# Patient Record
Sex: FEMALE | Race: WHITE | NOT HISPANIC OR LATINO | Employment: UNEMPLOYED | URBAN - METROPOLITAN AREA
[De-identification: names, ages, dates, MRNs, and addresses within clinical notes are randomized per-mention and may not be internally consistent; named-entity substitution may affect disease eponyms.]

---
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
## 2024-01-08 ENCOUNTER — ROUTINE PRENATAL (OUTPATIENT)
Dept: OBSTETRICS AND GYNECOLOGY | Facility: CLINIC | Age: 19
End: 2024-01-08
Payer: COMMERCIAL

## 2024-01-08 VITALS — WEIGHT: 156 LBS | BODY MASS INDEX: 25.96 KG/M2 | SYSTOLIC BLOOD PRESSURE: 107 MMHG | DIASTOLIC BLOOD PRESSURE: 66 MMHG

## 2024-01-08 DIAGNOSIS — K21.9 GASTROESOPHAGEAL REFLUX DURING PREGNANCY IN THIRD TRIMESTER, ANTEPARTUM: ICD-10-CM

## 2024-01-08 DIAGNOSIS — Z3A.29 29 WEEKS GESTATION OF PREGNANCY: Primary | ICD-10-CM

## 2024-01-08 DIAGNOSIS — O99.613 GASTROESOPHAGEAL REFLUX DURING PREGNANCY IN THIRD TRIMESTER, ANTEPARTUM: ICD-10-CM

## 2024-01-08 DIAGNOSIS — Z34.03 PRIMIGRAVIDA IN THIRD TRIMESTER: ICD-10-CM

## 2024-01-08 LAB
GLUCOSE UR STRIP-MCNC: NEGATIVE MG/DL
PROT UR STRIP-MCNC: NEGATIVE MG/DL

## 2024-01-08 PROCEDURE — 0502F SUBSEQUENT PRENATAL CARE: CPT | Performed by: OBSTETRICS & GYNECOLOGY

## 2024-01-08 RX ORDER — OMEPRAZOLE 20 MG/1
20 CAPSULE, DELAYED RELEASE ORAL DAILY
Qty: 30 CAPSULE | Refills: 1 | Status: SHIPPED | OUTPATIENT
Start: 2024-01-08 | End: 2025-01-07

## 2024-01-08 RX ORDER — ACETAMINOPHEN 325 MG/1
TABLET ORAL
COMMUNITY
Start: 2023-12-10

## 2024-01-08 NOTE — PROGRESS NOTES
Cc:  Pregnancy follow up.  Good FM.  Reports issues with reflux that is unresponsive to Famotidine.  No bleeding or spotting.  Vitals reviewed by me.  Gen - alert and pleasant.  Abdomen - nontender  Ultrasound with EFW at 70 percentile.  A/P:  IUP at 29 weeks  - GERD.  Discussed diet and GERD.  Discussed avoiding certain foods.  Will try Prilosec.  - Discussed maternal well being, vaccinations and their importance.

## 2024-01-25 RX ORDER — FAMOTIDINE 20 MG/1
TABLET, FILM COATED ORAL
Qty: 60 TABLET | OUTPATIENT
Start: 2024-01-25

## 2024-01-25 NOTE — TELEPHONE ENCOUNTER
I spoke to the patient she is aware the Pepcid was denied for a refill and because her prescription was changed to Prilosec. She was advised to not take Prilosec and Pepcid together. Patient voiced understanding. 1-25-24/lw

## 2024-01-29 ENCOUNTER — ROUTINE PRENATAL (OUTPATIENT)
Dept: OBSTETRICS AND GYNECOLOGY | Facility: CLINIC | Age: 19
End: 2024-01-29
Payer: COMMERCIAL

## 2024-01-29 VITALS — SYSTOLIC BLOOD PRESSURE: 97 MMHG | BODY MASS INDEX: 26.79 KG/M2 | DIASTOLIC BLOOD PRESSURE: 64 MMHG | WEIGHT: 161 LBS

## 2024-01-29 DIAGNOSIS — Z3A.32 32 WEEKS GESTATION OF PREGNANCY: ICD-10-CM

## 2024-01-29 DIAGNOSIS — O99.891 BACK PAIN AFFECTING PREGNANCY IN THIRD TRIMESTER: ICD-10-CM

## 2024-01-29 DIAGNOSIS — Z23 NEED FOR TDAP VACCINATION: ICD-10-CM

## 2024-01-29 DIAGNOSIS — Z34.03 PRIMIGRAVIDA IN THIRD TRIMESTER: Primary | ICD-10-CM

## 2024-01-29 DIAGNOSIS — K21.9 GASTROESOPHAGEAL REFLUX DURING PREGNANCY IN THIRD TRIMESTER, ANTEPARTUM: ICD-10-CM

## 2024-01-29 DIAGNOSIS — O26.843 FUNDAL HEIGHT LOW FOR DATES IN THIRD TRIMESTER: ICD-10-CM

## 2024-01-29 DIAGNOSIS — O99.613 GASTROESOPHAGEAL REFLUX DURING PREGNANCY IN THIRD TRIMESTER, ANTEPARTUM: ICD-10-CM

## 2024-01-29 DIAGNOSIS — M54.9 BACK PAIN AFFECTING PREGNANCY IN THIRD TRIMESTER: ICD-10-CM

## 2024-01-29 PROCEDURE — 0502F SUBSEQUENT PRENATAL CARE: CPT | Performed by: OBSTETRICS & GYNECOLOGY

## 2024-01-29 PROCEDURE — 90471 IMMUNIZATION ADMIN: CPT | Performed by: OBSTETRICS & GYNECOLOGY

## 2024-01-29 PROCEDURE — 90715 TDAP VACCINE 7 YRS/> IM: CPT | Performed by: OBSTETRICS & GYNECOLOGY

## 2024-01-29 NOTE — PROGRESS NOTES
Cc:  Pregnancy follow up.  No complaints except for back pain and swelling.  She is having some difficulties completing work tasks and requests note for accommodations including sitting at work and wearing sensible shoes.. GERD is better with taking Prilosec. Good FM.    Vitals reviewed by me.  Gen - alert and pleasant.  Abdomen - nontender, no guarding or rebound.  A/P:  IUP at 32 weeks with GERD, fundal height low, back pain.  - Patient received Tdap today, in office, no reaction. RSV vaccine information give.  - GERD.  Continue Prilosec.  - S<D.  Sonogram next visit.  - Back pain.  Discussed maternity belt.  - Discussed maternal well being.

## 2024-02-12 ENCOUNTER — TELEPHONE (OUTPATIENT)
Dept: OBSTETRICS AND GYNECOLOGY | Facility: CLINIC | Age: 19
End: 2024-02-12

## 2024-02-12 ENCOUNTER — ROUTINE PRENATAL (OUTPATIENT)
Dept: OBSTETRICS AND GYNECOLOGY | Facility: CLINIC | Age: 19
End: 2024-02-12
Payer: COMMERCIAL

## 2024-02-12 VITALS — SYSTOLIC BLOOD PRESSURE: 114 MMHG | WEIGHT: 164 LBS | DIASTOLIC BLOOD PRESSURE: 75 MMHG | BODY MASS INDEX: 27.29 KG/M2

## 2024-02-12 DIAGNOSIS — A60.00 GENITAL HERPES SIMPLEX, UNSPECIFIED SITE: ICD-10-CM

## 2024-02-12 DIAGNOSIS — Z34.03 PRIMIGRAVIDA IN THIRD TRIMESTER: Primary | ICD-10-CM

## 2024-02-12 DIAGNOSIS — Z3A.34 34 WEEKS GESTATION OF PREGNANCY: ICD-10-CM

## 2024-02-12 PROCEDURE — 0502F SUBSEQUENT PRENATAL CARE: CPT | Performed by: OBSTETRICS & GYNECOLOGY

## 2024-02-12 RX ORDER — VALACYCLOVIR HYDROCHLORIDE 500 MG/1
500 TABLET, FILM COATED ORAL DAILY
Qty: 30 TABLET | Refills: 1 | Status: SHIPPED | OUTPATIENT
Start: 2024-02-12 | End: 2024-04-12

## 2024-02-26 ENCOUNTER — ROUTINE PRENATAL (OUTPATIENT)
Dept: OBSTETRICS AND GYNECOLOGY | Facility: CLINIC | Age: 19
End: 2024-02-26
Payer: COMMERCIAL

## 2024-02-26 VITALS — BODY MASS INDEX: 27.96 KG/M2 | SYSTOLIC BLOOD PRESSURE: 109 MMHG | DIASTOLIC BLOOD PRESSURE: 73 MMHG | WEIGHT: 168 LBS

## 2024-02-26 DIAGNOSIS — Z36.85 ANTENATAL SCREENING FOR STREPTOCOCCUS B: ICD-10-CM

## 2024-02-26 DIAGNOSIS — Z11.3 SCREENING FOR STD (SEXUALLY TRANSMITTED DISEASE): ICD-10-CM

## 2024-02-26 DIAGNOSIS — Z3A.36 36 WEEKS GESTATION OF PREGNANCY: Primary | ICD-10-CM

## 2024-02-26 DIAGNOSIS — B00.9 HERPES SIMPLEX VIRUS TYPE 2 (HSV-2) INFECTION AFFECTING PREGNANCY IN THIRD TRIMESTER: ICD-10-CM

## 2024-02-26 DIAGNOSIS — O98.513 HERPES SIMPLEX VIRUS TYPE 2 (HSV-2) INFECTION AFFECTING PREGNANCY IN THIRD TRIMESTER: ICD-10-CM

## 2024-02-26 DIAGNOSIS — N89.8 VAGINAL ODOR: ICD-10-CM

## 2024-02-26 LAB
GLUCOSE UR STRIP-MCNC: NEGATIVE MG/DL
PROT UR STRIP-MCNC: NEGATIVE MG/DL

## 2024-02-26 PROCEDURE — 0502F SUBSEQUENT PRENATAL CARE: CPT | Performed by: NURSE PRACTITIONER

## 2024-02-26 NOTE — PROGRESS NOTES
Chief Complaint   Patient presents with    Routine Prenatal Visit       OB follow up     Travon Weller is a 18 y.o.  36w2d being seen today for her obstetrical visit.  Patient reports ongoing fish like vaginal odor, mild discharge present, this started around 2 weeks ago. C/o pelvic pain after she has been resting. Denies cramping or contractions. Fetal movement: normal. She requests syphilis testing today. Denies current symptoms, states she has heard about the rise of positive cases and wants to be safe.     Review of Systems  Cramping/contractions: denies  Vaginal bleeding: denies  Fetal movement: normal    /73   Wt 76.2 kg (168 lb)   LMP 2023   BMI 27.96 kg/m²     Assessment/Plan    Diagnoses and all orders for this visit:    1. 36 weeks gestation of pregnancy (Primary)  -     POC Urinalysis Dipstick    2.  screening for streptococcus B  -     Strep Gp B Culture+Rfx ([facility]) - Swab, Vaginal/Rectum    3. Herpes simplex virus type 2 (HSV-2) infection affecting pregnancy in third trimester    4. Vaginal odor  -     NuSwab VG+ - Swab, Vagina    5. Screening for STD (sexually transmitted disease)  -     RPR, Rfx Qn RPR / Confirm TP       Reviewed fetal kick counts  Reviewed S&S labor  GBS collected today  SVE FT/50/-3  Vaginal cultures obtained for c/o discharge and odor, scant white discharge noted, will await cultures and treat if indicated  RPR per pt request, discussed will get an additional test upon admission to hospital.   Continue valtrex  Reviewed this stage of pregnancy  Problem list updated     Follow up in 1 week(s) with me    I spent 30 minutes caring for Travon on this date of service. This time includes time spent by me in the following activities: preparing for the visit, reviewing tests, obtaining and/or reviewing a separately obtained history, performing a medically appropriate examination and/or evaluation, counseling and educating the patient/family/caregiver,  ordering medications, tests, or procedures, referring and communicating with other health care professionals, documenting information in the medical record, and independently interpreting results and communicating that information with the patient/family/caregiver    Ruby Romo, APRN  2/26/2024  14:45 EST

## 2024-02-27 LAB — RPR SER QL: NON REACTIVE

## 2024-02-28 LAB
A VAGINAE DNA VAG QL NAA+PROBE: ABNORMAL SCORE
BVAB2 DNA VAG QL NAA+PROBE: ABNORMAL SCORE
C ALBICANS DNA VAG QL NAA+PROBE: POSITIVE
C GLABRATA DNA VAG QL NAA+PROBE: NEGATIVE
C TRACH DNA VAG QL NAA+PROBE: POSITIVE
MEGA1 DNA VAG QL NAA+PROBE: ABNORMAL SCORE
N GONORRHOEA DNA VAG QL NAA+PROBE: NEGATIVE
T VAGINALIS DNA VAG QL NAA+PROBE: NEGATIVE

## 2024-02-29 ENCOUNTER — TELEPHONE (OUTPATIENT)
Dept: OBSTETRICS AND GYNECOLOGY | Facility: CLINIC | Age: 19
End: 2024-02-29
Payer: COMMERCIAL

## 2024-02-29 RX ORDER — FLUCONAZOLE 150 MG/1
150 TABLET ORAL ONCE
Qty: 1 TABLET | Refills: 0 | Status: SHIPPED | OUTPATIENT
Start: 2024-02-29 | End: 2024-02-29

## 2024-02-29 RX ORDER — AZITHROMYCIN 500 MG/1
1000 TABLET, FILM COATED ORAL DAILY
Qty: 2 TABLET | Refills: 0 | Status: SHIPPED | OUTPATIENT
Start: 2024-02-29 | End: 2024-03-01

## 2024-02-29 NOTE — TELEPHONE ENCOUNTER
[facility]. No answer, left VM to return my call    Vaginal cultures returned positive for chlamydia and yeast.  I have sent azithromycin and diflucan to pharmacy to treat. I recommend that her partner be tested and treated, she should avoid intercourse until seven days after both have been treated. I recommend a follow up appointment in 6-8 weeks for repeat testing to make sure STI is resolved. I recommend the use of condoms to prevent sexually transmitted infections    Ruby Romo, APRN  2/29/2024  08:46 EST

## 2024-02-29 NOTE — PROGRESS NOTES
Positive chlamydia result in third trimester. I reached out to the pt today with no answer. Left  to return my call. She has reviewed results on [facility]. I have sent antibiotics to her pharmacy -Ruby

## 2024-03-01 LAB — B-HEM STREP SPEC QL CULT: NEGATIVE

## 2024-03-04 ENCOUNTER — ROUTINE PRENATAL (OUTPATIENT)
Dept: OBSTETRICS AND GYNECOLOGY | Facility: CLINIC | Age: 19
End: 2024-03-04
Payer: COMMERCIAL

## 2024-03-04 DIAGNOSIS — B00.9 HERPES SIMPLEX VIRUS TYPE 2 (HSV-2) INFECTION AFFECTING PREGNANCY IN THIRD TRIMESTER: ICD-10-CM

## 2024-03-04 DIAGNOSIS — Z3A.37 37 WEEKS GESTATION OF PREGNANCY: ICD-10-CM

## 2024-03-04 DIAGNOSIS — O98.513 HERPES SIMPLEX VIRUS TYPE 2 (HSV-2) INFECTION AFFECTING PREGNANCY IN THIRD TRIMESTER: ICD-10-CM

## 2024-03-04 DIAGNOSIS — Z34.03 PRIMIGRAVIDA IN THIRD TRIMESTER: Primary | ICD-10-CM

## 2024-03-04 PROCEDURE — 0502F SUBSEQUENT PRENATAL CARE: CPT | Performed by: OBSTETRICS & GYNECOLOGY

## 2024-03-04 NOTE — PROGRESS NOTES
Cc:  Pregnancy follow up.  Patient c/o ctxs. She did complete her treatment for Chlamydia, partner his going for treatment today.    Vitals reviewed by me.  Gen - alert and pleasant.  Abdomen - nontender  Cervix - 80/1-2/-2  GBS is negative.  A/P:  IUP at 37 weeks with HSV history.  - HSV.  Continue Valtrex for prophylaxis.  - Labor precautions.

## 2024-03-06 ENCOUNTER — HOSPITAL ENCOUNTER (EMERGENCY)
Facility: HOSPITAL | Age: 19
Discharge: HOME | End: 2024-03-06
Attending: OBSTETRICS & GYNECOLOGY | Admitting: OBSTETRICS & GYNECOLOGY
Payer: COMMERCIAL

## 2024-03-06 VITALS
DIASTOLIC BLOOD PRESSURE: 59 MMHG | HEIGHT: 65 IN | BODY MASS INDEX: 28.32 KG/M2 | OXYGEN SATURATION: 98 % | WEIGHT: 170 LBS | SYSTOLIC BLOOD PRESSURE: 92 MMHG | TEMPERATURE: 98.1 F | RESPIRATION RATE: 16 BRPM | HEART RATE: 108 BPM

## 2024-03-06 LAB
A1 MICROGLOB PLACENTAL VAG QL: NEGATIVE
BACTERIA UR QL AUTO: ABNORMAL /HPF
BILIRUB UR QL STRIP: NEGATIVE
CLARITY UR: CLEAR
COLOR UR: ABNORMAL
GLUCOSE UR STRIP-MCNC: NEGATIVE MG/DL
HGB UR QL STRIP.AUTO: NEGATIVE
HYALINE CASTS UR QL AUTO: ABNORMAL /LPF
KETONES UR QL STRIP: ABNORMAL
LEUKOCYTE ESTERASE UR QL STRIP.AUTO: ABNORMAL
NITRITE UR QL STRIP: NEGATIVE
PH UR STRIP.AUTO: 6.5 [PH] (ref 5–8)
PROT UR QL STRIP: ABNORMAL
RBC # UR STRIP: ABNORMAL /HPF
REF LAB TEST METHOD: ABNORMAL
SP GR UR STRIP: >=1.03 (ref 1–1.03)
SQUAMOUS #/AREA URNS HPF: ABNORMAL /HPF
UROBILINOGEN UR QL STRIP: ABNORMAL
WBC # UR STRIP: ABNORMAL /HPF

## 2024-03-06 PROCEDURE — 84112 EVAL AMNIOTIC FLUID PROTEIN: CPT | Performed by: OBSTETRICS & GYNECOLOGY

## 2024-03-06 PROCEDURE — 99284 EMERGENCY DEPT VISIT MOD MDM: CPT | Performed by: OBSTETRICS & GYNECOLOGY

## 2024-03-06 PROCEDURE — 59025 FETAL NON-STRESS TEST: CPT

## 2024-03-06 PROCEDURE — 81001 URINALYSIS AUTO W/SCOPE: CPT | Performed by: OBSTETRICS & GYNECOLOGY
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
## 2024-03-09 ENCOUNTER — HOSPITAL ENCOUNTER (EMERGENCY)
Facility: HOSPITAL | Age: 19
Discharge: HOME | End: 2024-03-10
Attending: OBSTETRICS & GYNECOLOGY | Admitting: STUDENT IN AN ORGANIZED HEALTH CARE EDUCATION/TRAINING PROGRAM
Payer: COMMERCIAL

## 2024-03-09 PROCEDURE — 99284 EMERGENCY DEPT VISIT MOD MDM: CPT | Performed by: STUDENT IN AN ORGANIZED HEALTH CARE EDUCATION/TRAINING PROGRAM

## 2024-03-09 PROCEDURE — 84112 EVAL AMNIOTIC FLUID PROTEIN: CPT | Performed by: STUDENT IN AN ORGANIZED HEALTH CARE EDUCATION/TRAINING PROGRAM

## 2024-03-10 VITALS
HEART RATE: 88 BPM | RESPIRATION RATE: 16 BRPM | BODY MASS INDEX: 28.22 KG/M2 | WEIGHT: 169.6 LBS | OXYGEN SATURATION: 98 % | DIASTOLIC BLOOD PRESSURE: 73 MMHG | SYSTOLIC BLOOD PRESSURE: 102 MMHG

## 2024-03-10 LAB — A1 MICROGLOB PLACENTAL VAG QL: NEGATIVE

## 2024-03-10 PROCEDURE — 59025 FETAL NON-STRESS TEST: CPT
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
## 2024-03-11 ENCOUNTER — ROUTINE PRENATAL (OUTPATIENT)
Dept: OBSTETRICS AND GYNECOLOGY | Facility: CLINIC | Age: 19
End: 2024-03-11
Payer: COMMERCIAL

## 2024-03-11 ENCOUNTER — TELEPHONE (OUTPATIENT)
Dept: OBSTETRICS AND GYNECOLOGY | Facility: CLINIC | Age: 19
End: 2024-03-11

## 2024-03-11 VITALS — DIASTOLIC BLOOD PRESSURE: 65 MMHG | WEIGHT: 170 LBS | BODY MASS INDEX: 28.29 KG/M2 | SYSTOLIC BLOOD PRESSURE: 96 MMHG

## 2024-03-11 DIAGNOSIS — B00.9 HERPES SIMPLEX VIRUS TYPE 2 (HSV-2) INFECTION AFFECTING PREGNANCY IN THIRD TRIMESTER: ICD-10-CM

## 2024-03-11 DIAGNOSIS — O98.513 HERPES SIMPLEX VIRUS TYPE 2 (HSV-2) INFECTION AFFECTING PREGNANCY IN THIRD TRIMESTER: ICD-10-CM

## 2024-03-11 DIAGNOSIS — Z3A.38 38 WEEKS GESTATION OF PREGNANCY: ICD-10-CM

## 2024-03-11 DIAGNOSIS — Z34.03 PRIMIGRAVIDA IN THIRD TRIMESTER: Primary | ICD-10-CM

## 2024-03-11 DIAGNOSIS — Z20.2 CHLAMYDIA CONTACT, TREATED: ICD-10-CM

## 2024-03-11 LAB
BILIRUB BLD-MCNC: NEGATIVE MG/DL
EXPIRATION DATE: ABNORMAL
EXPIRATION DATE: NORMAL
GLUCOSE UR STRIP-MCNC: NEGATIVE MG/DL
GLUCOSE UR STRIP-MCNC: NEGATIVE MG/DL
KETONES UR QL: NEGATIVE
LEUKOCYTE EST, POC: ABNORMAL
Lab: ABNORMAL
Lab: NORMAL
NITRITE UR-MCNC: NEGATIVE MG/ML
PH UR: 6.5 [PH] (ref 5–8)
PROT UR STRIP-MCNC: NEGATIVE MG/DL
PROT UR STRIP-MCNC: NEGATIVE MG/DL
RBC # UR STRIP: ABNORMAL /UL
SP GR UR: 1.01 (ref 1–1.03)
UROBILINOGEN UR QL: ABNORMAL

## 2024-03-11 PROCEDURE — 0502F SUBSEQUENT PRENATAL CARE: CPT | Performed by: OBSTETRICS & GYNECOLOGY

## 2024-03-11 NOTE — PROGRESS NOTES
Cc:  Pregnancy follow up.  Patient c/o back pain that began yesterday.  She reports pain will last for hours at a time, then stop 30 minutes to a hour then return again. She denies LOF or bleeding and states she is having good FM.   Vitals reviewed by me.  Gen - alert and pleasant.  Abdomen - gravid, nontender.  Cervix - 80/2/-2  Cc U/A showed Trace of Blood and Small Leukocytes.   WOLFGANG for Chlamydia.   A/P:  IUP at 38 weeks with Chlamydia, HSV history  - Chlamydia.  WOLFGANG done.  - HSV.  Continue suppression.  - Labor precautions.

## 2024-03-11 NOTE — TELEPHONE ENCOUNTER
38 weeks pregnant. Having extreme back pain. No contractions. Water not broke. Baby moving a lot. Has appointment this afternoon.Pt Ph [phone]

## 2024-03-13 LAB
BACTERIA UR CULT: NORMAL
BACTERIA UR CULT: NORMAL
C TRACH RRNA SPEC QL NAA+PROBE: POSITIVE
N GONORRHOEA RRNA SPEC QL NAA+PROBE: NEGATIVE
T VAGINALIS RRNA SPEC QL NAA+PROBE: NEGATIVE

## 2024-03-14 ENCOUNTER — TELEPHONE (OUTPATIENT)
Dept: OBSTETRICS AND GYNECOLOGY | Facility: CLINIC | Age: 19
End: 2024-03-14
Payer: COMMERCIAL

## 2024-03-14 RX ORDER — AZITHROMYCIN 1 G/1
1 POWDER, FOR SUSPENSION ORAL ONCE
Qty: 1 PACKET | Refills: 0 | Status: SHIPPED | OUTPATIENT
Start: 2024-03-14 | End: 2024-03-14

## 2024-03-14 NOTE — TELEPHONE ENCOUNTER
Pt called stating she was confused as to why her test results stated she has chlamydia again but pt had just gotten swabbed 2 week prior and it hadn't gone away yet. Pt stated she is going to get swabbed again at her appt on 4/8 and see if it's still there or gone. Pt was prescribed medication on 2/12 and did take both pills. Thank you

## 2024-03-14 NOTE — TELEPHONE ENCOUNTER
Patient with repeat infection.  Suspect reinfection but patient should refrain from sexual activity until end of pregnancy.

## 2024-03-18 ENCOUNTER — ROUTINE PRENATAL (OUTPATIENT)
Dept: OBSTETRICS AND GYNECOLOGY | Facility: CLINIC | Age: 19
End: 2024-03-18
Payer: COMMERCIAL

## 2024-03-18 VITALS — DIASTOLIC BLOOD PRESSURE: 71 MMHG | WEIGHT: 172 LBS | BODY MASS INDEX: 28.62 KG/M2 | SYSTOLIC BLOOD PRESSURE: 104 MMHG

## 2024-03-18 DIAGNOSIS — B00.9 HERPES SIMPLEX VIRUS TYPE 2 (HSV-2) INFECTION AFFECTING PREGNANCY IN THIRD TRIMESTER: ICD-10-CM

## 2024-03-18 DIAGNOSIS — O98.513 HERPES SIMPLEX VIRUS TYPE 2 (HSV-2) INFECTION AFFECTING PREGNANCY IN THIRD TRIMESTER: ICD-10-CM

## 2024-03-18 DIAGNOSIS — Z34.03 PRIMIGRAVIDA IN THIRD TRIMESTER: Primary | ICD-10-CM

## 2024-03-18 DIAGNOSIS — Z3A.39 39 WEEKS GESTATION OF PREGNANCY: ICD-10-CM

## 2024-03-18 PROCEDURE — 0502F SUBSEQUENT PRENATAL CARE: CPT | Performed by: OBSTETRICS & GYNECOLOGY

## 2024-03-18 NOTE — PROGRESS NOTES
Cc:  Pregnancy follow up.  Patient had brief episode of decreased fetal movement that improved after eating.  No bleeding or spotting.  Some cramping/pressure noted.  Vitals reviewed  Gen - alert and pleasant.  Abdomen - gravid, nontender.  Cervix - 80/2-3/-2  A/P:  IUP at 39 weeks  - Induction of labor tomorrow night for favorable cervix at term.  Discussed induction process.

## 2024-03-20 ENCOUNTER — HOSPITAL ENCOUNTER (INPATIENT)
Facility: HOSPITAL | Age: 19
LOS: 2 days | Discharge: HOME | End: 2024-03-22
Attending: OBSTETRICS & GYNECOLOGY | Admitting: OBSTETRICS & GYNECOLOGY
Payer: COMMERCIAL

## 2024-03-20 ENCOUNTER — ANESTHESIA (OUTPATIENT)
Dept: LABOR AND DELIVERY | Facility: HOSPITAL | Age: 19
End: 2024-03-20
Payer: COMMERCIAL

## 2024-03-20 ENCOUNTER — HOSPITAL ENCOUNTER (INPATIENT)
Dept: LABOR AND DELIVERY | Facility: HOSPITAL | Age: 19
Discharge: HOME | End: 2024-03-20
Payer: COMMERCIAL

## 2024-03-20 ENCOUNTER — ANESTHESIA EVENT (OUTPATIENT)
Dept: LABOR AND DELIVERY | Facility: HOSPITAL | Age: 19
End: 2024-03-20
Payer: COMMERCIAL

## 2024-03-20 PROBLEM — Z34.90 PREGNANCY: Status: ACTIVE | Noted: 2024-03-20

## 2024-03-20 LAB
ABO GROUP BLD: NORMAL
ALBUMIN SERPL-MCNC: 3.8 G/DL (ref 3.5–5.2)
ALBUMIN/GLOB SERPL: 1.1 G/DL
ALP SERPL-CCNC: 201 U/L (ref 39–117)
ALT SERPL W P-5'-P-CCNC: 5 U/L (ref 1–33)
ANION GAP SERPL CALCULATED.3IONS-SCNC: 14.7 MMOL/L (ref 5–15)
ANISOCYTOSIS BLD QL: ABNORMAL
AST SERPL-CCNC: 11 U/L (ref 1–32)
BILIRUB SERPL-MCNC: 0.3 MG/DL (ref 0–1.2)
BLD GP AB SCN SERPL QL: NEGATIVE
BUN SERPL-MCNC: 6 MG/DL (ref 6–20)
BUN/CREAT SERPL: 11.3 (ref 7–25)
CALCIUM SPEC-SCNC: 8.9 MG/DL (ref 8.6–10.5)
CHLORIDE SERPL-SCNC: 107 MMOL/L (ref 98–107)
CO2 SERPL-SCNC: 18.3 MMOL/L (ref 22–29)
CREAT SERPL-MCNC: 0.53 MG/DL (ref 0.57–1)
DEPRECATED RDW RBC AUTO: 38.1 FL (ref 37–54)
EGFRCR SERPLBLD CKD-EPI 2021: 136 ML/MIN/1.73
ERYTHROCYTE [DISTWIDTH] IN BLOOD BY AUTOMATED COUNT: 14.4 % (ref 12.3–15.4)
GLOBULIN UR ELPH-MCNC: 3.5 GM/DL
GLUCOSE SERPL-MCNC: 113 MG/DL (ref 65–99)
HCT VFR BLD AUTO: 32 % (ref 34–46.6)
HGB BLD-MCNC: 10.9 G/DL (ref 12–15.9)
LYMPHOCYTES # BLD MANUAL: 2.7 10*3/MM3 (ref 0.7–3.1)
LYMPHOCYTES NFR BLD MANUAL: 3.2 % (ref 5–12)
MCH RBC QN AUTO: 25.5 PG (ref 26.6–33)
MCHC RBC AUTO-ENTMCNC: 34.1 G/DL (ref 31.5–35.7)
MCV RBC AUTO: 74.8 FL (ref 79–97)
MICROCYTES BLD QL: ABNORMAL
MONOCYTES # BLD: 0.37 10*3/MM3 (ref 0.1–0.9)
NEUTROPHILS # BLD AUTO: 8.46 10*3/MM3 (ref 1.7–7)
NEUTROPHILS NFR BLD MANUAL: 73.4 % (ref 42.7–76)
NRBC BLD AUTO-RTO: 0 /100 WBC (ref 0–0.2)
PLAT MORPH BLD: NORMAL
PLATELET # BLD AUTO: 343 10*3/MM3 (ref 140–450)
PMV BLD AUTO: 10.2 FL (ref 6–12)
POIKILOCYTOSIS BLD QL SMEAR: ABNORMAL
POLYCHROMASIA BLD QL SMEAR: ABNORMAL
POTASSIUM SERPL-SCNC: 3.6 MMOL/L (ref 3.5–5.2)
PROT SERPL-MCNC: 7.3 G/DL (ref 6–8.5)
RBC # BLD AUTO: 4.28 10*6/MM3 (ref 3.77–5.28)
RH BLD: POSITIVE
RPR SER QL: REACTIVE
RPR SER-TITR: ABNORMAL {TITER}
SMUDGE CELLS BLD QL SMEAR: ABNORMAL
SODIUM SERPL-SCNC: 140 MMOL/L (ref 136–145)
T PALLIDUM IGG SER QL: REACTIVE
T PALLIDUM IGG SER QL: REACTIVE
T&S EXPIRATION DATE: NORMAL
VARIANT LYMPHS NFR BLD MANUAL: 23.4 % (ref 19.6–45.3)
WBC NRBC COR # BLD AUTO: 11.52 10*3/MM3 (ref 3.4–10.8)

## 2024-03-20 PROCEDURE — C1755 CATHETER, INTRASPINAL: HCPCS | Performed by: ANESTHESIOLOGY

## 2024-03-20 PROCEDURE — 85025 COMPLETE CBC W/AUTO DIFF WBC: CPT | Performed by: OBSTETRICS & GYNECOLOGY

## 2024-03-20 PROCEDURE — C1755 CATHETER, INTRASPINAL: HCPCS

## 2024-03-20 PROCEDURE — 85007 BL SMEAR W/DIFF WBC COUNT: CPT | Performed by: OBSTETRICS & GYNECOLOGY

## 2024-03-20 PROCEDURE — 86900 BLOOD TYPING SEROLOGIC ABO: CPT | Performed by: OBSTETRICS & GYNECOLOGY

## 2024-03-20 PROCEDURE — 86901 BLOOD TYPING SEROLOGIC RH(D): CPT | Performed by: OBSTETRICS & GYNECOLOGY

## 2024-03-20 PROCEDURE — 86592 SYPHILIS TEST NON-TREP QUAL: CPT | Performed by: OBSTETRICS & GYNECOLOGY

## 2024-03-20 PROCEDURE — 25810000003 LACTATED RINGERS PER 1000 ML: Performed by: OBSTETRICS & GYNECOLOGY

## 2024-03-20 PROCEDURE — 86780 TREPONEMA PALLIDUM: CPT | Performed by: OBSTETRICS & GYNECOLOGY

## 2024-03-20 PROCEDURE — 25010000002 MORPHINE PER 10 MG: Performed by: OBSTETRICS & GYNECOLOGY

## 2024-03-20 PROCEDURE — 86850 RBC ANTIBODY SCREEN: CPT | Performed by: OBSTETRICS & GYNECOLOGY

## 2024-03-20 PROCEDURE — 99222 1ST HOSP IP/OBS MODERATE 55: CPT | Performed by: INTERNAL MEDICINE

## 2024-03-20 PROCEDURE — 80053 COMPREHEN METABOLIC PANEL: CPT | Performed by: OBSTETRICS & GYNECOLOGY

## 2024-03-20 PROCEDURE — 0HQ9XZZ REPAIR PERINEUM SKIN, EXTERNAL APPROACH: ICD-10-PCS | Performed by: OBSTETRICS & GYNECOLOGY

## 2024-03-20 PROCEDURE — 86593 SYPHILIS TEST NON-TREP QUANT: CPT | Performed by: OBSTETRICS & GYNECOLOGY

## 2024-03-20 PROCEDURE — 59400 OBSTETRICAL CARE: CPT | Performed by: OBSTETRICS & GYNECOLOGY

## 2024-03-20 PROCEDURE — 25010000002 BUPIVACAINE (PF) 0.25 % SOLUTION: Performed by: ANESTHESIOLOGY

## 2024-03-20 PROCEDURE — 88307 TISSUE EXAM BY PATHOLOGIST: CPT

## 2024-03-20 PROCEDURE — 0UQMXZZ REPAIR VULVA, EXTERNAL APPROACH: ICD-10-PCS | Performed by: OBSTETRICS & GYNECOLOGY

## 2024-03-20 RX ORDER — OXYTOCIN/0.9 % SODIUM CHLORIDE 30/500 ML
250 PLASTIC BAG, INJECTION (ML) INTRAVENOUS CONTINUOUS
Status: ACTIVE | OUTPATIENT
Start: 2024-03-20 | End: 2024-03-20

## 2024-03-20 RX ORDER — OXYTOCIN/0.9 % SODIUM CHLORIDE 30/500 ML
125 PLASTIC BAG, INJECTION (ML) INTRAVENOUS ONCE AS NEEDED
Status: DISCONTINUED | OUTPATIENT
Start: 2024-03-20 | End: 2024-03-22 | Stop reason: HOSPADM

## 2024-03-20 RX ORDER — BUPIVACAINE HYDROCHLORIDE 2.5 MG/ML
INJECTION, SOLUTION EPIDURAL; INFILTRATION; INTRACAUDAL AS NEEDED
Status: DISCONTINUED | OUTPATIENT
Start: 2024-03-20 | End: 2024-03-20 | Stop reason: SURG

## 2024-03-20 RX ORDER — METHYLERGONOVINE MALEATE 0.2 MG/ML
200 INJECTION INTRAVENOUS ONCE AS NEEDED
Status: DISCONTINUED | OUTPATIENT
Start: 2024-03-20 | End: 2024-03-20

## 2024-03-20 RX ORDER — TERBUTALINE SULFATE 1 MG/ML
0.25 INJECTION, SOLUTION SUBCUTANEOUS AS NEEDED
Status: DISCONTINUED | OUTPATIENT
Start: 2024-03-20 | End: 2024-03-20

## 2024-03-20 RX ORDER — SODIUM CHLORIDE 0.9 % (FLUSH) 0.9 %
10 SYRINGE (ML) INJECTION EVERY 12 HOURS SCHEDULED
Status: DISCONTINUED | OUTPATIENT
Start: 2024-03-20 | End: 2024-03-20

## 2024-03-20 RX ORDER — TEMAZEPAM 7.5 MG/1
7.5 CAPSULE ORAL NIGHTLY PRN
Status: DISCONTINUED | OUTPATIENT
Start: 2024-03-20 | End: 2024-03-22 | Stop reason: HOSPADM

## 2024-03-20 RX ORDER — SODIUM CHLORIDE, SODIUM LACTATE, POTASSIUM CHLORIDE, CALCIUM CHLORIDE 600; 310; 30; 20 MG/100ML; MG/100ML; MG/100ML; MG/100ML
125 INJECTION, SOLUTION INTRAVENOUS CONTINUOUS
Status: DISCONTINUED | OUTPATIENT
Start: 2024-03-20 | End: 2024-03-20

## 2024-03-20 RX ORDER — FENTANYL/ROPIVACAINE/NS/PF 2MCG/ML-.2
10 PLASTIC BAG, INJECTION (ML) INJECTION CONTINUOUS
Status: DISCONTINUED | OUTPATIENT
Start: 2024-03-20 | End: 2024-03-20

## 2024-03-20 RX ORDER — ONDANSETRON 4 MG/1
4 TABLET, ORALLY DISINTEGRATING ORAL EVERY 6 HOURS PRN
Status: DISCONTINUED | OUTPATIENT
Start: 2024-03-20 | End: 2024-03-20

## 2024-03-20 RX ORDER — TRAMADOL HYDROCHLORIDE 50 MG/1
50 TABLET ORAL EVERY 6 HOURS PRN
Status: DISCONTINUED | OUTPATIENT
Start: 2024-03-20 | End: 2024-03-22 | Stop reason: HOSPADM

## 2024-03-20 RX ORDER — LIDOCAINE HYDROCHLORIDE AND EPINEPHRINE 15; 5 MG/ML; UG/ML
INJECTION, SOLUTION EPIDURAL AS NEEDED
Status: DISCONTINUED | OUTPATIENT
Start: 2024-03-20 | End: 2024-03-20 | Stop reason: SURG

## 2024-03-20 RX ORDER — DOCUSATE SODIUM 100 MG/1
100 CAPSULE, LIQUID FILLED ORAL 2 TIMES DAILY
Status: DISCONTINUED | OUTPATIENT
Start: 2024-03-20 | End: 2024-03-22 | Stop reason: HOSPADM

## 2024-03-20 RX ORDER — SODIUM CHLORIDE 9 MG/ML
40 INJECTION, SOLUTION INTRAVENOUS AS NEEDED
Status: DISCONTINUED | OUTPATIENT
Start: 2024-03-20 | End: 2024-03-20

## 2024-03-20 RX ORDER — TRANEXAMIC ACID 10 MG/ML
1000 INJECTION, SOLUTION INTRAVENOUS ONCE AS NEEDED
Status: DISCONTINUED | OUTPATIENT
Start: 2024-03-20 | End: 2024-03-20

## 2024-03-20 RX ORDER — OXYTOCIN/0.9 % SODIUM CHLORIDE 30/500 ML
999 PLASTIC BAG, INJECTION (ML) INTRAVENOUS ONCE
Status: DISCONTINUED | OUTPATIENT
Start: 2024-03-20 | End: 2024-03-22 | Stop reason: HOSPADM

## 2024-03-20 RX ORDER — BISACODYL 10 MG
10 SUPPOSITORY, RECTAL RECTAL DAILY PRN
Status: DISCONTINUED | OUTPATIENT
Start: 2024-03-21 | End: 2024-03-22 | Stop reason: HOSPADM

## 2024-03-20 RX ORDER — ONDANSETRON 2 MG/ML
4 INJECTION INTRAMUSCULAR; INTRAVENOUS EVERY 6 HOURS PRN
Status: DISCONTINUED | OUTPATIENT
Start: 2024-03-20 | End: 2024-03-22 | Stop reason: HOSPADM

## 2024-03-20 RX ORDER — OXYTOCIN/0.9 % SODIUM CHLORIDE 30/500 ML
2-20 PLASTIC BAG, INJECTION (ML) INTRAVENOUS
Status: DISCONTINUED | OUTPATIENT
Start: 2024-03-20 | End: 2024-03-20

## 2024-03-20 RX ORDER — BUPIVACAINE HYDROCHLORIDE 2.5 MG/ML
INJECTION, SOLUTION EPIDURAL; INFILTRATION; INTRACAUDAL
Status: COMPLETED
Start: 2024-03-20 | End: 2024-03-20

## 2024-03-20 RX ORDER — CALCIUM CARBONATE 500 MG/1
2 TABLET, CHEWABLE ORAL 3 TIMES DAILY PRN
Status: DISCONTINUED | OUTPATIENT
Start: 2024-03-20 | End: 2024-03-22 | Stop reason: HOSPADM

## 2024-03-20 RX ORDER — OXYTOCIN/0.9 % SODIUM CHLORIDE 30/500 ML
125 PLASTIC BAG, INJECTION (ML) INTRAVENOUS ONCE AS NEEDED
Status: DISCONTINUED | OUTPATIENT
Start: 2024-03-20 | End: 2024-03-20

## 2024-03-20 RX ORDER — CARBOPROST TROMETHAMINE 250 UG/ML
250 INJECTION, SOLUTION INTRAMUSCULAR
Status: DISCONTINUED | OUTPATIENT
Start: 2024-03-20 | End: 2024-03-20

## 2024-03-20 RX ORDER — ONDANSETRON 2 MG/ML
4 INJECTION INTRAMUSCULAR; INTRAVENOUS ONCE AS NEEDED
Status: DISCONTINUED | OUTPATIENT
Start: 2024-03-20 | End: 2024-03-20

## 2024-03-20 RX ORDER — LIDOCAINE HYDROCHLORIDE 10 MG/ML
0.5 INJECTION, SOLUTION INFILTRATION; PERINEURAL ONCE AS NEEDED
Status: DISCONTINUED | OUTPATIENT
Start: 2024-03-20 | End: 2024-03-20

## 2024-03-20 RX ORDER — DIPHENHYDRAMINE HYDROCHLORIDE 50 MG/ML
12.5 INJECTION INTRAMUSCULAR; INTRAVENOUS EVERY 8 HOURS PRN
Status: DISCONTINUED | OUTPATIENT
Start: 2024-03-20 | End: 2024-03-20

## 2024-03-20 RX ORDER — NALOXONE HCL 0.4 MG/ML
0.4 VIAL (ML) INJECTION
Status: DISCONTINUED | OUTPATIENT
Start: 2024-03-20 | End: 2024-03-20

## 2024-03-20 RX ORDER — ACETAMINOPHEN 325 MG/1
650 TABLET ORAL EVERY 4 HOURS PRN
Status: DISCONTINUED | OUTPATIENT
Start: 2024-03-20 | End: 2024-03-20

## 2024-03-20 RX ORDER — MAGNESIUM CARB/ALUMINUM HYDROX 105-160MG
30 TABLET,CHEWABLE ORAL ONCE AS NEEDED
Status: DISCONTINUED | OUTPATIENT
Start: 2024-03-20 | End: 2024-03-20

## 2024-03-20 RX ORDER — MORPHINE SULFATE 2 MG/ML
1 INJECTION, SOLUTION INTRAMUSCULAR; INTRAVENOUS EVERY 4 HOURS PRN
Status: DISCONTINUED | OUTPATIENT
Start: 2024-03-20 | End: 2024-03-20

## 2024-03-20 RX ORDER — IBUPROFEN 600 MG/1
600 TABLET ORAL EVERY 6 HOURS PRN
Status: DISCONTINUED | OUTPATIENT
Start: 2024-03-20 | End: 2024-03-22 | Stop reason: HOSPADM

## 2024-03-20 RX ORDER — FAMOTIDINE 20 MG/1
20 TABLET, FILM COATED ORAL 2 TIMES DAILY PRN
Status: DISCONTINUED | OUTPATIENT
Start: 2024-03-20 | End: 2024-03-20

## 2024-03-20 RX ORDER — SODIUM CHLORIDE 0.9 % (FLUSH) 0.9 %
10 SYRINGE (ML) INJECTION AS NEEDED
Status: DISCONTINUED | OUTPATIENT
Start: 2024-03-20 | End: 2024-03-20

## 2024-03-20 RX ORDER — MISOPROSTOL 200 UG/1
800 TABLET ORAL ONCE AS NEEDED
Status: DISCONTINUED | OUTPATIENT
Start: 2024-03-20 | End: 2024-03-20

## 2024-03-20 RX ORDER — ACETAMINOPHEN 325 MG/1
650 TABLET ORAL EVERY 6 HOURS PRN
Status: DISCONTINUED | OUTPATIENT
Start: 2024-03-20 | End: 2024-03-22 | Stop reason: HOSPADM

## 2024-03-20 RX ORDER — HYDROCORTISONE 25 MG/G
CREAM TOPICAL AS NEEDED
Status: DISCONTINUED | OUTPATIENT
Start: 2024-03-20 | End: 2024-03-22 | Stop reason: HOSPADM

## 2024-03-20 RX ORDER — PRENATAL VIT/IRON FUM/FOLIC AC 27MG-0.8MG
1 TABLET ORAL DAILY
Status: DISCONTINUED | OUTPATIENT
Start: 2024-03-20 | End: 2024-03-22 | Stop reason: HOSPADM

## 2024-03-20 RX ORDER — ONDANSETRON 4 MG/1
4 TABLET, ORALLY DISINTEGRATING ORAL EVERY 8 HOURS PRN
Status: DISCONTINUED | OUTPATIENT
Start: 2024-03-20 | End: 2024-03-22 | Stop reason: HOSPADM

## 2024-03-20 RX ORDER — ONDANSETRON 2 MG/ML
4 INJECTION INTRAMUSCULAR; INTRAVENOUS EVERY 6 HOURS PRN
Status: DISCONTINUED | OUTPATIENT
Start: 2024-03-20 | End: 2024-03-20

## 2024-03-20 RX ORDER — FAMOTIDINE 10 MG/ML
20 INJECTION, SOLUTION INTRAVENOUS 2 TIMES DAILY PRN
Status: DISCONTINUED | OUTPATIENT
Start: 2024-03-20 | End: 2024-03-20

## 2024-03-20 RX ORDER — SODIUM CHLORIDE 0.9 % (FLUSH) 0.9 %
1-10 SYRINGE (ML) INJECTION AS NEEDED
Status: DISCONTINUED | OUTPATIENT
Start: 2024-03-20 | End: 2024-03-22 | Stop reason: HOSPADM

## 2024-03-20 RX ORDER — EPHEDRINE SULFATE 50 MG/ML
5 INJECTION, SOLUTION INTRAVENOUS
Status: DISCONTINUED | OUTPATIENT
Start: 2024-03-20 | End: 2024-03-20

## 2024-03-20 RX ADMIN — Medication 2 MILLI-UNITS/MIN: at 01:39

## 2024-03-20 RX ADMIN — SODIUM CHLORIDE, POTASSIUM CHLORIDE, SODIUM LACTATE AND CALCIUM CHLORIDE 125 ML/HR: 600; 310; 30; 20 INJECTION, SOLUTION INTRAVENOUS at 01:07

## 2024-03-20 RX ADMIN — IBUPROFEN 600 MG: 600 TABLET, FILM COATED ORAL at 20:42

## 2024-03-20 RX ADMIN — MORPHINE SULFATE 1 MG: 2 INJECTION, SOLUTION INTRAMUSCULAR; INTRAVENOUS at 04:45

## 2024-03-20 RX ADMIN — SODIUM CHLORIDE, POTASSIUM CHLORIDE, SODIUM LACTATE AND CALCIUM CHLORIDE 125 ML/HR: 600; 310; 30; 20 INJECTION, SOLUTION INTRAVENOUS at 08:11

## 2024-03-20 RX ADMIN — BUPIVACAINE HYDROCHLORIDE 10 ML: 2.5 INJECTION, SOLUTION EPIDURAL; INFILTRATION; INTRACAUDAL at 11:45

## 2024-03-20 RX ADMIN — Medication 10 ML/HR: at 07:40

## 2024-03-20 RX ADMIN — LIDOCAINE HYDROCHLORIDE AND EPINEPHRINE 3 ML: 15; 5 INJECTION, SOLUTION EPIDURAL at 07:36

## 2024-03-20 RX ADMIN — SODIUM CHLORIDE, POTASSIUM CHLORIDE, SODIUM LACTATE AND CALCIUM CHLORIDE 125 ML/HR: 600; 310; 30; 20 INJECTION, SOLUTION INTRAVENOUS at 06:38

## 2024-03-20 RX ADMIN — DOCUSATE SODIUM 100 MG: 100 CAPSULE, LIQUID FILLED ORAL at 20:42

## 2024-03-20 NOTE — ANESTHESIA PREPROCEDURE EVALUATION
Anesthesia Evaluation     NPO Solid Status: > 8 hours  NPO Liquid Status: > 8 hours           Airway   Mallampati: I  No difficulty expected  Dental      Pulmonary    Cardiovascular   Exercise tolerance: good (4-7 METS)        Neuro/Psych  (+) psychiatric history  GI/Hepatic/Renal/Endo      Musculoskeletal     Abdominal    Substance History      OB/GYN          Other                    Anesthesia Plan    ASA 2     epidural       Anesthetic plan, risks, benefits, and alternatives have been provided, discussed and informed consent has been obtained with: patient.    CODE STATUS:    Level Of Support Discussed With: Patient  Code Status (Patient has no pulse and is not breathing): CPR (Attempt to Resuscitate)  Medical Interventions (Patient has pulse or is breathing): Full Support

## 2024-03-20 NOTE — L&D DELIVERY NOTE
DELIVERY REPORT    [facility]  Patient: Travon Weller  : 2005  Age: 19 y.o.  Unit Number: [id number]    DATE OF DELIVERY: 3/20/2024    PREOPERATIVE DIAGNOSIS:  1)  Intrauterine Pregnancy at 39 weeks.  2)  HSV history, currently pregnant.  3)  Chlamydia in Pregnancy.  4)  Positive RPR in pregnancy.    POSTOPERATIVE DIAGNOSIS:  same as pre-op diagnosis    PROCEDURE PERFORMED:   Spontaneous Vaginal Delivery    SURGEON: Yoav Hernandez MD    ASSISTANT:   None    ANESTHESIA:   Epidural    ESTIMATED BLOOD LOSS:   141 ml    FINDINGS:     Live Viable Male Infant // Weight  8 lbs  5 oz // Apgar 1 minute 8 and Apgar 5 minutes 9             Normal placenta and cord                        1st Degree vaginal laceration and right labial laceration.    DESCRIPTION OF PROCEDURE: Patient is a 19 year old primigravid at 39 weeks admitted for induction of labor with favorable cervix at term.  Patient with prenatal care complicated by STD in pregnancy including recurrent Chlamydia.  She had history of HSV and received Valtrex prophylaxis in the last month of pregnancy.  Patient underwent induction with pitocin.  She received an epidural for pain control and had spontaneous rupture of membranes.  Patient progressed along a normal labor curve with reassuring fetal status.  She reached the second stage and had strong urge to push.  Delivery team was assembled and perineum was prepped with betadine.  With continued pushing for less than 20 minutes, patient delivered a live viable male infant in the occiput anterior position.  Nose and mouth were bulb suctioned upon presentation of the head.  With continued pushing, the right anterior shoulder, torso and body delivered without difficulty.  Infant was vigorous, delayed cord clamping occurred, the cord was cut and infant was presented to parents.  Cord blood was obtained and placenta was delivered spontaneously intact with 3 vessel cord noted.  A 1st degree vaginal  laceration and right labial laceration were repaired with 3-0 Monocryl without difficulty.  Pitocin was given for prevention of uterine atony.    COMPLICATIONS: None  SPECIMEN:  Placenta  DISPOSITION:  Mother and baby remained in LDR in stable condition       Yoav Hernandez MD  Completed: 3/20/2024

## 2024-03-20 NOTE — PLAN OF CARE
Goal Outcome Evaluation:  Plan of Care Reviewed With: patient           Outcome Evaluation:  of healthy baby boy at 1251. fundal checks wnl; firm without massage at the u with light bleeding. vss. pt rating pain 0. pt will be transferred to mb at end of 2hr recovery.

## 2024-03-20 NOTE — CONSULTS
Referring Provider: Yoav Hernandez MD  [address]  [address]  [place] [address]  Reason for Consultation: Positive RPR    Subjective   History of present illness: This is a 19-year-old female who is 39 weeks pregnant and presented today to the hospital for an elective induction.  RPR testing was obtained and came back positive 1:2.  The patient first tested positive for syphilis in December 2022.  Given her penicillin allergy she received doxycycline at that time.  Subsequent testing was nonreactive.  Her last negative RPR was on February 26.  Testing was obtained today and came back positive with a titer of 1:2.  Of note the patient also tested positive for chlamydia back in February and March and was treated appropriately.  She denies any rashes.  She denies any genital lesions or vaginal discharge.  She states she has not been sexually active over the last month or 2.    Past Medical History:   Diagnosis Date    Chlamydia 08/14/2023    took meds in February    Depression     no meds or counselor - mom is support    Eczema     better during pregnancy    Gonorrhea     2022    Herpes     taking valtrex    Syphilis     2022       Past Surgical History:   Procedure Laterality Date    WISDOM TOOTH EXTRACTION Bilateral         reports that she has never smoked. She has never been exposed to tobacco smoke. She has never used smokeless tobacco. She reports that she does not currently use alcohol. She reports that she does not currently use drugs after having used the following drugs: Marijuana.    family history includes Bipolar disorder in her father; Depression in her father; Diabetes in her father and sister; No Known Problems in her brother and mother.    Allergies   Allergen Reactions    Penicillins Hives    Amoxicillin Hives       Medication:  Antibiotics:  none  Please refer to the medical record for a full medication list    Review of Systems  Pertinent items are noted in HPI, all other  systems reviewed and negative    Objective   Vital Signs   Temp:  [97.9 °F (36.6 °C)-98.5 °F (36.9 °C)] 97.9 °F (36.6 °C)  Heart Rate:  [] 122  Resp:  [16-18] 16  BP: ()/(37-81) 80/58    Physical Exam:   General: In no acute distress  HEENT: Normocephalic, atraumatic,  no scleral icterus.  Neck: Supple, trachea is midline  Cardiovascular: Tachycardic normal rhythm  Respiratory: Thin comfortably on room air  GI: Abdomen gravid  Musculoskeletal: no edema, tenderness or deformity  Skin: No rashes  Extremities: No C/C  Neurological: Alert and oriented, moving all 4 extremities  Psychiatric: Normal mood and affect     Results Review:   I reviewed the patient's new clinical results.  I reviewed the patient's new imaging results and agree with the interpretation.    Lab Results   Component Value Date    WBC 11.52 (H) 03/20/2024    HGB 10.9 (L) 03/20/2024    HCT 32.0 (L) 03/20/2024    MCV 74.8 (L) 03/20/2024     03/20/2024       Lab Results   Component Value Date    GLUCOSE 113 (H) 03/20/2024    BUN 6 03/20/2024    CREATININE 0.53 (L) 03/20/2024    EGFRIFNONA  03/12/2020      Comment:      Unable to calculate GFR, patient age <=18.    EGFRIFAFRI >60 01/18/2023    BCR 11.3 03/20/2024    CO2 18.3 (L) 03/20/2024    CALCIUM 8.9 03/20/2024    PROTENTOTREF 6.9 12/18/2023    ALBUMIN 3.8 03/20/2024    LABIL2 1.4 12/18/2023    AST 11 03/20/2024    ALT 5 03/20/2024       Microbiology:  3/2 RPR reactive 1:2  T pallidum Abx positive    3/11 vaginal swab positive for chlamydia    2/26 vaginal swab positive for Candida albicans and chlamydia  2/26 RPR non reactive     11/2023 vaginal swab negative for chlamydia gonorrhea trichomonas  8/2023 RPR non reactive    12/20/2022 RPR reactive 1: 2     Radiology:  none    Assessment & Plan   Positive RPR  39 weeks pregnant complicating above  In active labor complicating above  History of recurrent chlamydia infection    The significance of a positive RPR with a titer of 1:2 is  unclear.  This could certainly be a false positive as it is frequently seen during pregnancy.  It could be a remanence of her infection in 2022 although prior RPR's done there after were nonreactive.  Finally given her high risk behavior this could also reflect a new syphilis diagnosis.  At this time I would recommend erring on the side of caution and assume that her positive RPR is indicative of reinfection.  The patient states that she was told by her mother that she developed hives as a child in the setting of penicillin.  Penicillin is the treatment of choice in pregnant patients.  Given her history of a penicillin allergy desensitization is indicated.  At this time, given the fact that she is in active labor and will deliver in the next 1 to 2 hours penicillin desensitization is not possible.  The whole process takes 3-1/2 hours and the risk at this time does not outweigh the benefit.  Neonatology was notified of the concern for a syphilis reinfection in the mother and will treat the baby accordingly.  I will discuss the patient's option after delivery to see if she like to pursue therapy or if we simply monitor her and repeat testing in the next few weeks to try to establish whether this is reinfection or a false positive.    I discussed the patient's findings and my recommendations with patient, nursing staff, and consulting provider

## 2024-03-20 NOTE — LACTATION NOTE
Outpatient Physical Therapy Ortho Treatment Note/Discharge Summary   [facility]     Patient Name: Brittny Han  : 1963  MRN: [id number]  Today's Date: 3/27/2018      Visit Date: 2018    Visit Dx:    ICD-10-CM ICD-9-CM   1. Neck pain M54.2 723.1       Patient Active Problem List   Diagnosis   • Dyspnea on exertion   • Obstructive sleep apnea, adult   • Anxiety   • Anemia   • Essential hypertension   • Peptic ulcer   • Periodic headache syndrome, not intractable   • Restless legs syndrome (RLS)   • Neck pain   • GERD   • Allergic rhinitis   • Asthma        Past Medical History:   Diagnosis Date   • Allergic rhinitis    • Anxiety    •  1 para 1    • History of echocardiogram 2011    mild ascending aortic root dilatation; mod RVC enlargement; global left EF 0.72 (normal 0.55-0.75) mild concentric LV hypertrophy; trace MR; mild TR    • History of PFTs 2016    good effort; normal lung volumes   • Hyperlipidemia         Past Surgical History:   Procedure Laterality Date   • BLADDER SUSPENSION     • BREAST EXCISIONAL BIOPSY Left    • HYSTERECTOMY     • SKIN CANCER EXCISION               PT Ortho     Row Name 18 0800       Subjective Comments    Subjective Comments Pt. reports no pain this morning.  She had a bad day on Saturday.  She may have slept wrong.  She has not tried the towel roll in pillow case yet, but she intends to.  -ISABEL       Subjective Pain    Able to rate subjective pain? yes  -ISABEL    Pre-Treatment Pain Level 0  -ISABEL    Post-Treatment Pain Level 0  -ISABEL      User Key  (r) = Recorded By, (t) = Taken By, (c) = Cosigned By    Initials Name Provider Type    ISABEL Osborne PT Physical Therapist                            PT Assessment/Plan     Row Name 18 0800          PT Assessment    Assessment Comments Pt. understands concepts of self-management of chronic symptoms and importance of consistency of exercise performance.  -ISABEL        PT Plan    PT Plan  This note was copied from a baby's chart.  Mom reported she wanted to breast feed and pump for her baby who is in the NICU at present but after bringing pump and supplies to her room she then decided she would prefer to formula feed baby only.   Comments DC to HEP/self-management.  -ISABEL       User Key  (r) = Recorded By, (t) = Taken By, (c) = Cosigned By    Initials Name Provider Type    ISABEL Osborne PT Physical Therapist                    Exercises     Row Name 03/27/18 0800             Subjective Comments    Subjective Comments Pt. reports no pain this morning.  She had a bad day on Saturday.  She may have slept wrong.  She has not tried the towel roll in pillow case yet, but she intends to.  -ISABEL         Subjective Pain    Able to rate subjective pain? yes  -ISABEL      Pre-Treatment Pain Level 0  -ISABEL      Post-Treatment Pain Level 0  -ISABEL         Exercise 1    Exercise Name 1 Final HEP review completed.  Reinforced importance of posture at work.  Reviewed positioning options and encouraged stretch breaks.  -ISABEL        User Key  (r) = Recorded By, (t) = Taken By, (c) = Cosigned By    Initials Name Provider Type    ISABEL Osborne PT Physical Therapist                        Manual Rx (last 36 hours)      Manual Treatments     Row Name 03/27/18 0700             Manual Rx 1    Manual Rx 1 Location Cervical spine  -ISABEL      Manual Rx 1 Type Astym and STM to cervical and suprascapular mm.    -ISABEL        User Key  (r) = Recorded By, (t) = Taken By, (c) = Cosigned By    Initials Name Provider Type    ISABEL Osborne PT Physical Therapist                             Time Calculation:   Start Time: 0800  Total Timed Code Minutes- PT: 30 minute(s)    Therapy Charges for Today     Code Description Service Date Service Provider Modifiers Qty    [id number]  PT THER PROC EA 15 MIN 3/27/2018 Fifi Osborne PT GP 1    [id number]  PT MANUAL THERAPY EA 15 MIN 3/27/2018 Fifi Osborne PT GP 1                OP PT Discharge Summary  Date of Discharge: 03/27/18  Reason for Discharge: Independent  Outcomes Achieved: Patient able to partially acheive established goals  Discharge Destination: Home with home program      Fifi Osborne  PT  3/27/2018
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
## 2024-03-20 NOTE — PLAN OF CARE
Problem: Adult Inpatient Plan of Care  Goal: Plan of Care Review  Outcome: Ongoing, Not Progressing  Flowsheets (Taken 3/20/2024 0609)  Progress: improving  Plan of Care Reviewed With: patient  Outcome Evaluation:   Pt admitted for scheduled IOL via pit. SVE 2/80%/-2. Pt starting to get more uncomfortable with contractions, denies epidural at this time   morphine given with no success. VSS, BP on softer end. Pt mom, sister, and FOB at bedside. Education given with epidural process and how pitocin works. Reassuring fhr. Anticipate for .  Goal: Patient-Specific Goal (Individualized)  Outcome: Ongoing, Not Progressing  Goal: Absence of Hospital-Acquired Illness or Injury  Outcome: Ongoing, Not Progressing  Intervention: Identify and Manage Fall Risk  Recent Flowsheet Documentation  Taken 3/20/2024 0300 by Eli Barber RN  Safety Promotion/Fall Prevention: safety round/check completed  Taken 3/20/2024 0131 by Eli Barber RN  Safety Promotion/Fall Prevention: safety round/check completed  Goal: Optimal Comfort and Wellbeing  Outcome: Ongoing, Not Progressing  Intervention: Provide Person-Centered Care  Recent Flowsheet Documentation  Taken 3/20/2024 013 by Eli Barber RN  Trust Relationship/Rapport:   care explained   choices provided   emotional support provided   empathic listening provided   questions answered   questions encouraged   reassurance provided   thoughts/feelings acknowledged  Goal: Readiness for Transition of Care  Outcome: Ongoing, Not Progressing  Intervention: Mutually Develop Transition Plan  Recent Flowsheet Documentation  Taken 3/20/2024 0105 by Eli Barber RN  Equipment Currently Used at Home: none  Taken 3/20/2024 0102 by Eli Barber RN  Equipment Currently Used at Home: none  Anticipated Changes Related to Illness: none  Transportation Anticipated:   car, drives self   family or friend will provide  Transportation Concerns: none  Readmission Within the Last 30 Days: no  previous admission in last 30 days  Patient/Family Anticipated Services at Transition: none  Patient/Family Anticipates Transition to: home with family     Problem: Bleeding (Labor)  Goal: Hemostasis  Outcome: Ongoing, Not Progressing     Problem: Change in Fetal Wellbeing (Labor)  Goal: Stable Fetal Wellbeing  Outcome: Ongoing, Not Progressing     Problem: Delayed Labor Progression (Labor)  Goal: Effective Progression to Delivery  Outcome: Ongoing, Not Progressing     Problem: Infection (Labor)  Goal: Absence of Infection Signs and Symptoms  Outcome: Ongoing, Not Progressing     Problem: Labor Pain (Labor)  Goal: Acceptable Pain Control  Outcome: Ongoing, Not Progressing     Problem: Uterine Tachysystole (Labor)  Goal: Normal Uterine Contraction Pattern  Outcome: Ongoing, Not Progressing     Problem: Fall Injury Risk  Goal: Absence of Fall and Fall-Related Injury  Outcome: Ongoing, Not Progressing  Intervention: Promote Injury-Free Environment  Recent Flowsheet Documentation  Taken 3/20/2024 0300 by Eli Barber RN  Safety Promotion/Fall Prevention: safety round/check completed  Taken 3/20/2024 0131 by Eli Barber RN  Safety Promotion/Fall Prevention: safety round/check completed     Problem: Pain Acute  Goal: Acceptable Pain Control and Functional Ability  Outcome: Ongoing, Not Progressing  Intervention: Optimize Psychosocial Wellbeing  Recent Flowsheet Documentation  Taken 3/20/2024 0131 by Eli Barber RN  Diversional Activities: smartphone   Goal Outcome Evaluation:  Plan of Care Reviewed With: patient        Progress: improving  Outcome Evaluation: Pt admitted for scheduled IOL via pit. SVE 2/80%/-2. Pt starting to get more uncomfortable with contractions, denies epidural at this time; morphine given with no success. VSS, BP on softer end. Pt mom, sister, and FOB at bedside. Education given with epidural process and how pitocin works. Reassuring fhr. Anticipate for .

## 2024-03-20 NOTE — ANESTHESIA PROCEDURE NOTES
Labor Epidural      Patient reassessed immediately prior to procedure    Patient location during procedure: OB  Performed By  Anesthesiologist: Parish Ribeiro MD  Preanesthetic Checklist  Completed: patient identified, IV checked, site marked, risks and benefits discussed, surgical consent, monitors and equipment checked, pre-op evaluation and timeout performed  Prep:  Pt Position:sitting  Sterile Tech:gloves, cap and sterile barrier  Prep:chlorhexidine gluconate and isopropyl alcohol  Monitoring:continuous pulse oximetry, EKG and blood pressure monitoring  Epidural Block Procedure:  Approach:midline  Guidance:landmark technique  Location:L3-L4  Needle Type:Tuohy  Needle Gauge:18 G  Loss of Resistance Medium: air  Loss of Resistance: 6cm  Cath Depth at skin:11 cm  Paresthesia: none  Aspiration:negative  Test Dose:negative  Number of Attempts: 2  Post Assessment:  Dressing:secured with tape and occlusive dressing applied  Pt Tolerance:patient tolerated the procedure well with no apparent complications  Complications:no

## 2024-03-21 LAB
BASOPHILS # BLD AUTO: 0.03 10*3/MM3 (ref 0–0.2)
BASOPHILS NFR BLD AUTO: 0.2 % (ref 0–1.5)
DEPRECATED RDW RBC AUTO: 37.9 FL (ref 37–54)
EOSINOPHIL # BLD AUTO: 0.05 10*3/MM3 (ref 0–0.4)
EOSINOPHIL NFR BLD AUTO: 0.4 % (ref 0.3–6.2)
ERYTHROCYTE [DISTWIDTH] IN BLOOD BY AUTOMATED COUNT: 14.4 % (ref 12.3–15.4)
HCT VFR BLD AUTO: 25.7 % (ref 34–46.6)
HGB BLD-MCNC: 8.8 G/DL (ref 12–15.9)
LYMPHOCYTES # BLD AUTO: 3.79 10*3/MM3 (ref 0.7–3.1)
LYMPHOCYTES NFR BLD AUTO: 31.1 % (ref 19.6–45.3)
MCH RBC QN AUTO: 25.4 PG (ref 26.6–33)
MCHC RBC AUTO-ENTMCNC: 34.2 G/DL (ref 31.5–35.7)
MCV RBC AUTO: 74.1 FL (ref 79–97)
MONOCYTES # BLD AUTO: 1.28 10*3/MM3 (ref 0.1–0.9)
MONOCYTES NFR BLD AUTO: 10.5 % (ref 5–12)
NEUTROPHILS NFR BLD AUTO: 57.4 % (ref 42.7–76)
NEUTROPHILS NFR BLD AUTO: 7 10*3/MM3 (ref 1.7–7)
PLATELET # BLD AUTO: 253 10*3/MM3 (ref 140–450)
PMV BLD AUTO: 10.5 FL (ref 6–12)
RBC # BLD AUTO: 3.47 10*6/MM3 (ref 3.77–5.28)
WBC NRBC COR # BLD AUTO: 12.2 10*3/MM3 (ref 3.4–10.8)

## 2024-03-21 PROCEDURE — 85025 COMPLETE CBC W/AUTO DIFF WBC: CPT | Performed by: OBSTETRICS & GYNECOLOGY

## 2024-03-21 PROCEDURE — 99232 SBSQ HOSP IP/OBS MODERATE 35: CPT | Performed by: INTERNAL MEDICINE

## 2024-03-21 PROCEDURE — 0503F POSTPARTUM CARE VISIT: CPT

## 2024-03-21 RX ORDER — FERROUS SULFATE 325(65) MG
325 TABLET ORAL
Status: DISCONTINUED | OUTPATIENT
Start: 2024-03-21 | End: 2024-03-22 | Stop reason: HOSPADM

## 2024-03-21 RX ADMIN — ACETAMINOPHEN 325MG 650 MG: 325 TABLET ORAL at 00:54

## 2024-03-21 RX ADMIN — ACETAMINOPHEN 325MG 650 MG: 325 TABLET ORAL at 08:42

## 2024-03-21 RX ADMIN — IBUPROFEN 600 MG: 600 TABLET, FILM COATED ORAL at 16:54

## 2024-03-21 RX ADMIN — Medication 1 TABLET: at 08:42

## 2024-03-21 RX ADMIN — IBUPROFEN 600 MG: 600 TABLET, FILM COATED ORAL at 04:54

## 2024-03-21 RX ADMIN — FERROUS SULFATE TAB 325 MG (65 MG ELEMENTAL FE) 325 MG: 325 (65 FE) TAB at 08:51

## 2024-03-21 RX ADMIN — DOCUSATE SODIUM 100 MG: 100 CAPSULE, LIQUID FILLED ORAL at 08:42

## 2024-03-21 NOTE — PROGRESS NOTES
LOS: 1 day     Chief Complaint: Positive RPR    Interval History: Afebrile, status post delivery yesterday.  Doing well postoperatively.  Pain well-controlled.  No shortness of breath cough    Vital Signs  Temp:  [97.2 °F (36.2 °C)-98.6 °F (37 °C)] 97.2 °F (36.2 °C)  Heart Rate:  [] 87  Resp:  [16-18] 16  BP: ()/() 103/69    Physical Exam:  General: In no acute distress  Cardiovascular: RRR, trace LE edema   Respiratory: Breathing comfortably on room air  GI: Soft, NT/ND, + bowel sounds bilaterally  Skin: No rashes     Antibiotics:  None     Results Review:    Lab Results   Component Value Date    WBC 12.20 (H) 03/21/2024    HGB 8.8 (L) 03/21/2024    HCT 25.7 (L) 03/21/2024    MCV 74.1 (L) 03/21/2024     03/21/2024     Lab Results   Component Value Date    GLUCOSE 113 (H) 03/20/2024    BUN 6 03/20/2024    CREATININE 0.53 (L) 03/20/2024    EGFRIFNONA  03/12/2020      Comment:      Unable to calculate GFR, patient age <=18.    EGFRIFAFRI >60 01/18/2023    BCR 11.3 03/20/2024    CO2 18.3 (L) 03/20/2024    CALCIUM 8.9 03/20/2024    PROTENTOTREF 6.9 12/18/2023    ALBUMIN 3.8 03/20/2024    LABIL2 1.4 12/18/2023    AST 11 03/20/2024    ALT 5 03/20/2024       Microbiology:  3/2 RPR reactive 1:2  T pallidum Abx positive     3/11 vaginal swab positive for chlamydia     2/26 vaginal swab positive for Candida albicans and chlamydia  2/26 RPR non reactive      11/2023 vaginal swab negative for chlamydia gonorrhea trichomonas  8/2023 RPR non reactive     12/20/2022 RPR reactive 1: 2     Assessment & Plan   Positive RPR  39 weeks pregnant complicating above  In active labor complicating above  History of recurrent chlamydia infection    Talked extensively to the patient again today about her weakly positive RPR.  The fact that she had a nonreactive RPR on February 26 makes a syphilis reinfection less likely.  We can certainly see false positive RPR's during pregnancy.  At this time I recommend monitoring  the patient and in 4 to 6 weeks repeating the RPR.  If at that time it is positive we can certainly discuss treatment.  I have provided the patient with the number to the infectious disease clinic and asked her to call me in 4 to 6 weeks to arrange testing.  The patient voiced understanding.    ID will sign off.  Please do not hesitate to call us with further questions or concerns

## 2024-03-21 NOTE — PROGRESS NOTES
VAGINAL DELIVERY POSTPARTUM DAY 1    3/21/2024  PATIENT: Travon Weller        MR#:[id number]  LOCATION: [facility]  DATE OF ADMISSION: 3/20/2024  ADMISSION  DIAGNOSIS:     Normal spontaneous vaginal delivery    Pregnancy   CURRENT DIAGNOSIS:   No notes have been filed under this hospital service.  Service: Hospitalist      SUBJECTIVE     Travon feels well.  Patient describes her lochia as less than menses.  Pain is well controlled.        OBJECTIVE   Temp: Temp:  [97.2 °F (36.2 °C)-98.6 °F (37 °C)] 97.2 °F (36.2 °C) Temp src: Oral   BP: BP: ()/() 103/69        Pulse: Heart Rate:  [] 87  RR: Resp:  [16-18] 16    General:  Awake, alert, no acute distress   Cardiac: Regular rate and rhythm    Respiratory: Lungs clear bilaterally, normal respiratory effort    Abdomen: Soft, non-distended, fundus firm, below umbilicus, appropriately tender   Pelvis: deferred   Extremities: Calves NT bilaterally, DTR 2+, no clonus noted, trace edema     Lab Results   Component Value Date    WBC 12.20 (H) 03/21/2024    HGB 8.8 (L) 03/21/2024    HCT 25.7 (L) 03/21/2024     03/21/2024    AST 11 03/20/2024    ALT 5 03/20/2024    CREATININE 0.53 (L) 03/20/2024       ASSESSMENT:  Postpartum day 1 after vaginal delivery. Hgb: 8.8.    PLAN:Doing well. Continue routine supportive care. Add PO ferrous sulfate to daily medications for hgb 8.8. Discontinue IV, advance diet, may shower. Plan for discharge tomorrow as clinical picture allows.     Kalee Lopez CNM  08:40 EDT  March 21, 2024

## 2024-03-21 NOTE — PLAN OF CARE
Problem: Adult Inpatient Plan of Care  Goal: Plan of Care Review  Outcome: Ongoing, Progressing  Flowsheets  Taken 3/21/2024 1758 by Shelley Hernandez RN  Outcome Evaluation: VSS, fundus and lochia WNL, visiting NICU several times during shift, pain well controlled  Taken 3/20/2024 1454 by Lizeth Ko RN  Plan of Care Reviewed With: patient  Taken 3/20/2024 0609 by Eli Barber RN  Progress: improving  Goal: Patient-Specific Goal (Individualized)  Outcome: Ongoing, Progressing  Goal: Absence of Hospital-Acquired Illness or Injury  Outcome: Ongoing, Progressing  Intervention: Identify and Manage Fall Risk  Recent Flowsheet Documentation  Taken 3/21/2024 1700 by Shelley Hernandez RN  Safety Promotion/Fall Prevention: safety round/check completed  Taken 3/21/2024 1600 by Shelley Hernandez RN  Safety Promotion/Fall Prevention: patient off unit  Taken 3/21/2024 1500 by Shelley Hernandez RN  Safety Promotion/Fall Prevention: safety round/check completed  Taken 3/21/2024 1400 by Shelley Hernanedz RN  Safety Promotion/Fall Prevention: safety round/check completed  Taken 3/21/2024 1300 by Shelley Hernandez RN  Safety Promotion/Fall Prevention: safety round/check completed  Taken 3/21/2024 1221 by Shelley Hernandez RN  Safety Promotion/Fall Prevention: safety round/check completed  Taken 3/21/2024 1100 by Shelley Hernandez RN  Safety Promotion/Fall Prevention: safety round/check completed  Taken 3/21/2024 1000 by Shelley Hernandez RN  Safety Promotion/Fall Prevention: safety round/check completed  Taken 3/21/2024 0919 by Shelley Hernandez RN  Safety Promotion/Fall Prevention: safety round/check completed  Taken 3/21/2024 0842 by Shelley Hernandez RN  Safety Promotion/Fall Prevention: safety round/check completed  Taken 3/21/2024 0730 by Shelley Hernandez RN  Safety Promotion/Fall Prevention: safety round/check completed  Intervention: Prevent Skin Injury  Recent Flowsheet Documentation  Taken 3/21/2024 0842 by Shelley Hernandez RN  Body  Position: sitting up in bed  Intervention: Prevent and Manage VTE (Venous Thromboembolism) Risk  Recent Flowsheet Documentation  Taken 3/21/2024 0842 by Shelley Hernandez RN  Activity Management: up ad emigdio  Intervention: Prevent Infection  Recent Flowsheet Documentation  Taken 3/21/2024 0842 by Shelley Hernandez RN  Infection Prevention: hand hygiene promoted  Goal: Optimal Comfort and Wellbeing  Outcome: Ongoing, Progressing  Intervention: Monitor Pain and Promote Comfort  Recent Flowsheet Documentation  Taken 3/21/2024 1654 by Shelley Hernandez RN  Pain Management Interventions: see MAR  Taken 3/21/2024 0842 by Shelley Hernandez RN  Pain Management Interventions: see MAR  Intervention: Provide Person-Centered Care  Recent Flowsheet Documentation  Taken 3/21/2024 0842 by Shelley Hernandez RN  Trust Relationship/Rapport: care explained  Goal: Readiness for Transition of Care  Outcome: Ongoing, Progressing     Problem: Bleeding (Labor)  Goal: Hemostasis  Outcome: Ongoing, Progressing     Problem: Change in Fetal Wellbeing (Labor)  Goal: Stable Fetal Wellbeing  Outcome: Ongoing, Progressing  Intervention: Promote and Monitor Fetal Wellbeing  Recent Flowsheet Documentation  Taken 3/21/2024 0842 by Shelley Hernandez RN  Body Position: sitting up in bed     Problem: Delayed Labor Progression (Labor)  Goal: Effective Progression to Delivery  Outcome: Ongoing, Progressing     Problem: Infection (Labor)  Goal: Absence of Infection Signs and Symptoms  Outcome: Ongoing, Progressing  Intervention: Prevent or Manage Infection  Recent Flowsheet Documentation  Taken 3/21/2024 0842 by Shelley Hernandez RN  Infection Prevention: hand hygiene promoted     Problem: Labor Pain (Labor)  Goal: Acceptable Pain Control  Outcome: Ongoing, Progressing     Problem: Uterine Tachysystole (Labor)  Goal: Normal Uterine Contraction Pattern  Outcome: Ongoing, Progressing     Problem: Fall Injury Risk  Goal: Absence of Fall and Fall-Related Injury  Outcome:  Ongoing, Progressing  Intervention: Identify and Manage Contributors  Recent Flowsheet Documentation  Taken 3/21/2024 0842 by Shelley Hernandez RN  Medication Review/Management: medications reviewed  Intervention: Promote Injury-Free Environment  Recent Flowsheet Documentation  Taken 3/21/2024 1700 by Shelley Hernandez RN  Safety Promotion/Fall Prevention: safety round/check completed  Taken 3/21/2024 1600 by Shelley Hernandez RN  Safety Promotion/Fall Prevention: patient off unit  Taken 3/21/2024 1500 by Shelley Hernandez RN  Safety Promotion/Fall Prevention: safety round/check completed  Taken 3/21/2024 1400 by Shelley Hernandez RN  Safety Promotion/Fall Prevention: safety round/check completed  Taken 3/21/2024 1300 by Shelley Hernandez RN  Safety Promotion/Fall Prevention: safety round/check completed  Taken 3/21/2024 1221 by Shelley Hernandez RN  Safety Promotion/Fall Prevention: safety round/check completed  Taken 3/21/2024 1100 by Shelley Hernandez RN  Safety Promotion/Fall Prevention: safety round/check completed  Taken 3/21/2024 1000 by Shelley Hernandez RN  Safety Promotion/Fall Prevention: safety round/check completed  Taken 3/21/2024 0919 by Shelley Hernandez RN  Safety Promotion/Fall Prevention: safety round/check completed  Taken 3/21/2024 0842 by Shelley Hernandez RN  Safety Promotion/Fall Prevention: safety round/check completed  Taken 3/21/2024 0730 by Shelley Hernandez RN  Safety Promotion/Fall Prevention: safety round/check completed     Problem: Pain Acute  Goal: Acceptable Pain Control and Functional Ability  Outcome: Ongoing, Progressing  Intervention: Prevent or Manage Pain  Recent Flowsheet Documentation  Taken 3/21/2024 0842 by Shelley Hernandez RN  Medication Review/Management: medications reviewed  Intervention: Develop Pain Management Plan  Recent Flowsheet Documentation  Taken 3/21/2024 1654 by Shelley Hernandez RN  Pain Management Interventions: see MAR  Taken 3/21/2024 0842 by Shelley Hernandez RN  Pain Management  Interventions: see MAR  Intervention: Optimize Psychosocial Wellbeing  Recent Flowsheet Documentation  Taken 3/21/2024 0842 by Shelley Hernandez RN  Supportive Measures: active listening utilized     Problem: Skin Injury Risk Increased  Goal: Skin Health and Integrity  Outcome: Ongoing, Progressing  Intervention: Optimize Skin Protection  Recent Flowsheet Documentation  Taken 3/21/2024 0842 by Shelley Hernandez RN  Head of Bed (HOB) Positioning: HOB at 30-45 degrees     Problem: Device-Related Complication Risk (Anesthesia/Analgesia, Neuraxial)  Goal: Safe Infusion Delivery Completion  Outcome: Ongoing, Progressing     Problem: Infection (Anesthesia/Analgesia, Neuraxial)  Goal: Absence of Infection Signs and Symptoms  Outcome: Ongoing, Progressing  Intervention: Prevent or Manage Infection  Recent Flowsheet Documentation  Taken 3/21/2024 0842 by Shelley Hernandez RN  Infection Prevention: hand hygiene promoted     Problem: Nausea and Vomiting (Anesthesia/Analgesia, Neuraxial)  Goal: Nausea and Vomiting Relief  Outcome: Ongoing, Progressing     Problem: Pain (Anesthesia/Analgesia, Neuraxial)  Goal: Effective Pain Control  Outcome: Ongoing, Progressing  Intervention: Prevent or Manage Pain  Recent Flowsheet Documentation  Taken 3/21/2024 1654 by Shelley Hernanedz RN  Pain Management Interventions: see MAR  Taken 3/21/2024 0842 by Shelley Hernandez RN  Pain Management Interventions: see MAR     Problem: Respiratory Compromise (Anesthesia/Analgesia, Neuraxial)  Goal: Effective Oxygenation and Ventilation  Outcome: Ongoing, Progressing  Intervention: Optimize Oxygenation and Ventilation  Recent Flowsheet Documentation  Taken 3/21/2024 0842 by Shelley Hernandez RN  Head of Bed (HOB) Positioning: HOB at 30-45 degrees     Problem: Sensorimotor Impairment (Anesthesia/Analgesia, Neuraxial)  Goal: Baseline Motor Function  Outcome: Ongoing, Progressing  Intervention: Optimize Sensorimotor Function  Recent Flowsheet Documentation  Taken  3/21/2024 1700 by Shelley Hernandez RN  Safety Promotion/Fall Prevention: safety round/check completed  Taken 3/21/2024 1600 by Shelley Hernandez RN  Safety Promotion/Fall Prevention: patient off unit  Taken 3/21/2024 1500 by Shelley Hernandez RN  Safety Promotion/Fall Prevention: safety round/check completed  Taken 3/21/2024 1400 by Shelley Hernandez RN  Safety Promotion/Fall Prevention: safety round/check completed  Taken 3/21/2024 1300 by Shelley Hernandez RN  Safety Promotion/Fall Prevention: safety round/check completed  Taken 3/21/2024 1221 by Shelley Hernandez RN  Safety Promotion/Fall Prevention: safety round/check completed  Taken 3/21/2024 1100 by Shelley Hernandez RN  Safety Promotion/Fall Prevention: safety round/check completed  Taken 3/21/2024 1000 by Shelley Hernandez RN  Safety Promotion/Fall Prevention: safety round/check completed  Taken 3/21/2024 0919 by Shelley Hernandez RN  Safety Promotion/Fall Prevention: safety round/check completed  Taken 3/21/2024 0842 by Shelley Hernandez RN  Safety Promotion/Fall Prevention: safety round/check completed  Taken 3/21/2024 0730 by Shelley Hernnadez RN  Safety Promotion/Fall Prevention: safety round/check completed     Problem: Urinary Retention (Anesthesia/Analgesia, Neuraxial)  Goal: Effective Urinary Elimination  Outcome: Ongoing, Progressing     Problem:  Fall Injury Risk  Goal: Absence of Fall, Infant Drop and Related Injury  Outcome: Ongoing, Progressing  Intervention: Identify and Manage Contributors  Recent Flowsheet Documentation  Taken 3/21/2024 0842 by Shelley Hernandez RN  Medication Review/Management: medications reviewed  Intervention: Promote Injury-Free Environment  Recent Flowsheet Documentation  Taken 3/21/2024 1700 by Shelley Hernandez RN  Safety Promotion/Fall Prevention: safety round/check completed  Taken 3/21/2024 1600 by Shelley Hernandez RN  Safety Promotion/Fall Prevention: patient off unit  Taken 3/21/2024 1500 by Shelley Hernandez RN  Safety  Promotion/Fall Prevention: safety round/check completed  Taken 3/21/2024 1400 by Shelley Hernandez RN  Safety Promotion/Fall Prevention: safety round/check completed  Taken 3/21/2024 1300 by Shelley Hernandez RN  Safety Promotion/Fall Prevention: safety round/check completed  Taken 3/21/2024 1221 by Shelley Hernandez RN  Safety Promotion/Fall Prevention: safety round/check completed  Taken 3/21/2024 1100 by Shelley Hernandez RN  Safety Promotion/Fall Prevention: safety round/check completed  Taken 3/21/2024 1000 by Shelley Hernandez RN  Safety Promotion/Fall Prevention: safety round/check completed  Taken 3/21/2024 0919 by Shelley Hernandez RN  Safety Promotion/Fall Prevention: safety round/check completed  Taken 3/21/2024 0842 by Shelley Hernandez RN  Safety Promotion/Fall Prevention: safety round/check completed  Taken 3/21/2024 0730 by Shelley Hernandez RN  Safety Promotion/Fall Prevention: safety round/check completed     Problem: Adjustment to Role Transition (Postpartum Vaginal Delivery)  Goal: Successful Maternal Role Transition  Outcome: Ongoing, Progressing  Intervention: Support Maternal Role Transition  Recent Flowsheet Documentation  Taken 3/21/2024 0842 by Shelley Hernandez RN  Supportive Measures: active listening utilized     Problem: Bleeding (Postpartum Vaginal Delivery)  Goal: Hemostasis  Outcome: Ongoing, Progressing     Problem: Infection (Postpartum Vaginal Delivery)  Goal: Absence of Infection Signs/Symptoms  Outcome: Ongoing, Progressing  Intervention: Prevent or Manage Infection  Recent Flowsheet Documentation  Taken 3/21/2024 0842 by Shelley Hernandez RN  Perineal Care:   perineal hygiene encouraged   perineal spray bottle/warm water use encouraged     Problem: Pain (Postpartum Vaginal Delivery)  Goal: Acceptable Pain Control  Outcome: Ongoing, Progressing  Intervention: Prevent or Manage Pain  Recent Flowsheet Documentation  Taken 3/21/2024 1654 by Shelley Hernandez RN  Pain Management Interventions: see  MAR  Taken 3/21/2024 0842 by Shelley Hernandez, RN  Pain Management Interventions: see MAR     Problem: Urinary Retention (Postpartum Vaginal Delivery)  Goal: Effective Urinary Elimination  Outcome: Ongoing, Progressing   Goal Outcome Evaluation:              Outcome Evaluation: VSS, fundus and lochia WNL, visiting NICU several times during shift, pain well controlled

## 2024-03-21 NOTE — PLAN OF CARE
Goal Outcome Evaluation:              Outcome Evaluation: VS stable, fundus and lochia WNL. Pain controlled with Tylenol and Motrin. Did not visit baby in NICU this shift, but was told she visited baby in NICU during dayshift. No new concerns at this time.

## 2024-03-21 NOTE — NURSING NOTE
RN called Infectious Disease office to request follow up on ID consult from yesterday. OB NP asked RN to f/u on this consult. Dr. Smart's office (ID) states that Dr. Smart is out of office today, but will see the patient tomorrow at the bedside, as she is the MD following this patient.

## 2024-03-21 NOTE — PROGRESS NOTES
"Discharge Planning Assessment  [facility]     Patient Name: Travon Weller  MRN: [id number]  Today's Date: 3/21/2024    Admit Date: 3/20/2024    Plan: Infant may discharge to mother when medically ready. CSW will follow infant's cord tox. Zuleyka DEJONCATALINO   Discharge Needs Assessment    No documentation.                  Discharge Plan       Row Name 03/21/24 1611       Plan    Plan Infant may discharge to mother when medically ready. CSW will follow infant's cord tox. CATALINO Wells    Plan Comments (continued) Mother is not current with [facility], but interested in applying. CSW has consulted the [facility] rep via email to see mother tomorrow. Mother denied feeling unsafe, threatened, or isolated from others at this time. Mother denied using THC during pregnancy. CSW spent a significant amount of time building rapport with mother, and offered validation, support, and encouragement to her throughout the assessment. CSW then asked mother's family/support to return to the room, and spent time listening to their concerns about how things were handled at mother's delivery and following transition to MB. CSW provided active listening, validation, and support to mother and family. They thanked CSW for taking the time to listen, and denied having unmet needs at this time. CSW provided a packet of resources to mother including: WIC, HANDS, transportation, infant supplies, counseling, online support groups, postpartum mood and anxiety resources, NICU parent resources, and general community resources. Mother was polite and cooperative throughout the assessment. CSW will follow infant's cord toxicology, and complete mandated reporting to CPS if warranted. CSW will also remain available for psychosocial needs during infant's NICU stay. CATALINO Wells      Row Name 03/21/24 1610       Plan    Plan Comments Mother: Travon Weller, MRN [id number]; Infant: Rigo \"Braylen\" Janee, MRN [id number]. CSW was consulted for \"infant in NICU\". " Of note, mother's UDS was negative prenatally on 8/4/23, and no UDS was collected on admission; no toxicology screens were ordered for infant as need was not indicated. Per chart, mother reported using THC prior to pregnancy. CSW received a call from the [employer] that maternal grandmother of infant was not willing to let the [employer] rep meet with mother to add infant to Medicaid. CSW had also been informed of other concerns voiced by MGM following delivery. CSW arrived at mother's room, and MGM, significant other, maternal great-aunt of infant, and maternal aunt of infant were also present. CSW requested to speak with mother alone, and informed family members CSW would bring them back in shortly. The [employer] rep was also with CSW, and completed her verification to add infant to Medicaid. Once family stepped out, mother verified her address, phone number and insurance. Mother shared she lives with maternal grandparents of infant. Mother reports having a car seat, crib/bassinet, clothes, and diapers for infant. This is mother's first child. Mother's support system includes maternal grandparents of infant, maternal great-aunt of infant, maternal aunt of infant, and potential FOB/SO. Mother shared there are two men who could be the father, and requested a list of facilities she could have paternity testing done. CSW provided list to mother. Mother shared both men are aware of infant and each other. Mother shared her SO that is here with her is willing to help raise infant even if he is not the biological father. Mother's SO appears to be supportive of mother. Infant will follow up with [facility], mother is comfortable scheduling appointments for infant, and has reliable transportation to appointments. (continued)                  Continued Care and Services - Admitted Since 3/20/2024    No active coordination exists for this encounter.       Expected Discharge Date and Time       Expected Discharge Date  Expected Discharge Time    Mar 21, 2024            Demographic Summary       Row Name 03/21/24 1608       General Information    Admission Type inpatient    Arrived From home    Referral Source nursing    Reason for Consult psychosocial concerns;substance use concerns;community resources    General Information Comments NICU admit, resources/support                   Functional Status       Row Name 03/21/24 1609       Mental Status    General Appearance WDL WDL       Mental Status Summary    Recent Changes in Mental Status/Cognitive Functioning no changes                   Psychosocial       Row Name 03/21/24 1609       Behavior WDL    Behavior WDL WDL       Emotion Mood WDL    Emotion/Mood/Affect WDL WDL       Speech WDL    Speech WDL WDL       Perceptual State WDL    Perceptual State WDL WDL       Thought Process WDL    Thought Process WDL WDL       Intellectual Performance WDL    Intellectual Performance WDL WDL       Coping/Stress    Major Change/Loss/Stressor birth;medical condition/diagnosis;role changes/responsibilities    Patient Personal Strengths expressive of needs;flexibility;humor;positive attitude;strong support system    Sources of Support other family members;parent(s);significant other;sibling(s)                   Abuse/Neglect       Row Name 03/21/24 1610       Personal Safety    Feels Unsafe at Home or Work/School no    Feels Threatened by Someone no    Does Anyone Try to Keep You From Having Contact with Others or Doing Things Outside Your Home? no    Physical Signs of Abuse Present no                   Legal    No documentation.                  Substance Abuse       Row Name 03/21/24 1610       Substance Use    Substance Use Comment mom reports she did not use THC prenatally                   Patient Forms    No documentation.                     BHAVNA Sawyer

## 2024-03-22 ENCOUNTER — LACTATION ENCOUNTER (OUTPATIENT)
Dept: NURSERY | Facility: HOSPITAL | Age: 19
End: 2024-03-22

## 2024-03-22 VITALS
BODY MASS INDEX: 28.96 KG/M2 | WEIGHT: 173.8 LBS | TEMPERATURE: 98.2 F | DIASTOLIC BLOOD PRESSURE: 68 MMHG | HEART RATE: 93 BPM | HEIGHT: 65 IN | RESPIRATION RATE: 18 BRPM | OXYGEN SATURATION: 98 % | SYSTOLIC BLOOD PRESSURE: 103 MMHG

## 2024-03-22 PROBLEM — A53.0 POSITIVE RPR TEST: Status: ACTIVE | Noted: 2024-03-22

## 2024-03-22 PROBLEM — Z34.90 PREGNANCY: Status: RESOLVED | Noted: 2024-03-20 | Resolved: 2024-03-22

## 2024-03-22 PROCEDURE — 0503F POSTPARTUM CARE VISIT: CPT

## 2024-03-22 PROCEDURE — 25010000002 MEDROXYPROGESTERONE 150 MG/ML SUSPENSION

## 2024-03-22 PROCEDURE — 90471 IMMUNIZATION ADMIN: CPT

## 2024-03-22 PROCEDURE — 90707 MMR VACCINE SC: CPT

## 2024-03-22 PROCEDURE — 25010000002 MEASLES, MUMPS & RUBELLA VAC RECONSTITUTED SOLUTION

## 2024-03-22 RX ORDER — IBUPROFEN 600 MG/1
600 TABLET ORAL EVERY 6 HOURS PRN
Qty: 60 TABLET | Refills: 0 | Status: SHIPPED | OUTPATIENT
Start: 2024-03-22

## 2024-03-22 RX ORDER — FERROUS SULFATE 325(65) MG
325 TABLET ORAL
Qty: 30 TABLET | Refills: 1 | Status: SHIPPED | OUTPATIENT
Start: 2024-03-23

## 2024-03-22 RX ORDER — MEDROXYPROGESTERONE ACETATE 150 MG/ML
150 INJECTION, SUSPENSION INTRAMUSCULAR ONCE
Status: COMPLETED | OUTPATIENT
Start: 2024-03-22 | End: 2024-03-22

## 2024-03-22 RX ADMIN — DOCUSATE SODIUM 100 MG: 100 CAPSULE, LIQUID FILLED ORAL at 08:57

## 2024-03-22 RX ADMIN — VARICELLA VIRUS VACCINE LIVE 0.5 ML: 1350 INJECTION, POWDER, LYOPHILIZED, FOR SUSPENSION SUBCUTANEOUS at 12:27

## 2024-03-22 RX ADMIN — TRAMADOL HYDROCHLORIDE 50 MG: 50 TABLET ORAL at 00:46

## 2024-03-22 RX ADMIN — IBUPROFEN 600 MG: 600 TABLET, FILM COATED ORAL at 12:38

## 2024-03-22 RX ADMIN — ACETAMINOPHEN 325MG 650 MG: 325 TABLET ORAL at 09:03

## 2024-03-22 RX ADMIN — FERROUS SULFATE TAB 325 MG (65 MG ELEMENTAL FE) 325 MG: 325 (65 FE) TAB at 08:57

## 2024-03-22 RX ADMIN — MEASLES, MUMPS, AND RUBELLA VIRUS VACCINE LIVE 0.5 ML: 1000; 12500; 1000 INJECTION, POWDER, LYOPHILIZED, FOR SUSPENSION SUBCUTANEOUS at 12:29

## 2024-03-22 RX ADMIN — Medication 1 TABLET: at 08:57

## 2024-03-22 RX ADMIN — MEDROXYPROGESTERONE ACETATE 150 MG: 150 INJECTION, SUSPENSION INTRAMUSCULAR at 12:27

## 2024-03-22 NOTE — PLAN OF CARE
Goal Outcome Evaluation:   VSS, up and emigdio. Ambulating to NICU to see infant. Pain controlled with prn medications. [employer] [facility] today

## 2024-03-22 NOTE — PROGRESS NOTES
"Continued Stay Note  [facility]     Patient Name: Travon Weller  MRN: [id number]  Today's Date: 3/22/2024    Admit Date: 3/20/2024    Plan: Infant may discharge to mother when medically ready; CSW will follow cord. CATALINO Downs.   Discharge Plan       Row Name 03/22/24 1126       Plan    Plan Infant may discharge to mother when medically ready; CSW will follow cord. CATALINO Downs.    Plan Comments Mother: Travon Weller, MRN [id number]; Infant: Rigo \"Braylen\" Janee, MRN [id number]. Mother asked to meet with social work. CSW met with mother at bedside while significant other and maternal aunt were in the room. Mother asked for a list and cost of paternity/DNA testing facilities in [place]. CSW provided mother with a list of paternity/DNA testing facilities in [place]. CSW explained the cost will vary based on the location, but the average cost is around $200-$400. CSW encouraged mother to call different testing locations for a price quote. Mother agreed. Mother denied having any other unmet needs or concerns at this time. CSW will follow infant's cord toxicology, and complete mandated reporting to CPS if warranted. CSW will also remain available for psychosocial needs during infant's NICU stay. CATALINO Downs.                   Discharge Codes    No documentation.                 Expected Discharge Date and Time       Expected Discharge Date Expected Discharge Time    Mar 22, 2024               BHAVNA Ellison    "

## 2024-03-22 NOTE — PROGRESS NOTES
VAGINAL DELIVERY POSTPARTUM DAY 2    3/22/2024  PATIENT: Travon Weller        MR#:[id number]  LOCATION: [facility]  DATE OF ADMISSION: 3/20/2024  ADMISSION  DIAGNOSIS:   Normal spontaneous vaginal delivery    Pregnancy     CURRENT DIAGNOSIS: No notes have been filed under this hospital service.  Service: Hospitalist      SUBJECTIVE     Travon feels well.  Patient describes her lochia as less than menses.  Pain is well controlled.       OBJECTIVE   Temp: Temp:  [97.1 °F (36.2 °C)-98.2 °F (36.8 °C)] 98.2 °F (36.8 °C) Temp src: Oral   BP: BP: ()/(56-72) 103/68        Pulse: Heart Rate:  [88-97] 93  RR: Resp:  [16-18] 18    General:  Awake, alert, no acute distress   Cardiac: Regular rate and rhythm    Respiratory: Lungs clear bilaterally, normal respiratory effort    Abdomen: Soft, non-distended, fundus firm, below umbilicus, appropriately tender   Pelvis: deferred   Extremities: Calves NT bilaterally, DTR 2+, no clonus noted, trace edema     Lab Results   Component Value Date    WBC 12.20 (H) 03/21/2024    HGB 8.8 (L) 03/21/2024    HCT 25.7 (L) 03/21/2024     03/21/2024    AST 11 03/20/2024    ALT 5 03/20/2024    CREATININE 0.53 (L) 03/20/2024       ASSESSMENT: Postpartum day 2 after vaginal delivery. Hgb: 8.8. Infectious disease opined that a repeat RPR should be done at postpartum visit.     PLAN: Doing well. Discharge to home. Discharge instructions given, precautions reviewed. Follow up with [facility]  in 4 to 6 weeks for routine postpartum visit and repeat RPR testing. Prescription for ibuprofen 600mg PO every 6 hours PRN for pain, docusate 100mg PO BID, and ferrous sulfate 325mg daily. Advised no tampons, menstrual cups, intercourse, or tub baths for 6 weeks.     Kalee Lopez CNM  09:37 EDT  March 22, 2024

## 2024-03-22 NOTE — DISCHARGE SUMMARY
VAGINAL DELIVERY DISCHARGE SUMMARY      PATIENT: Travon Weller        MR#:[id number]  LOCATION: [facility]  ADMISSION  DIAGNOSIS:   Normal spontaneous vaginal delivery    Positive RPR test     DISCHARGE DIAGNOSIS: No diagnosis found.      DATE OF ADMISSION: 3/20/2024  DATE OF DISCHARGE: 03/22/24     PROCEDURES:  Vaginal, Spontaneous     3/20/2024    12:51 PM      SERVICE: Obstetrics    HOSPITAL COURSE: Travon underwent vaginal delivery of a male infant and remained in the hospital for 2 days. During that time, Travon remained afebrile and hemodynamically stable. On the day of discharge, Travon was eating, ambulating and voiding without difficulty. She did have a positive RPR early in pregnancy and a week positive in her third trimester. She was seen by infectious disease before and after her delivery, who opined that a repeat RPR should be done at postpartum visit. Travon also opted to have a medroxyprogesterone injection prior to discharge for contraception management.      VARICELLA: unknown immunity - varicella immunization ordered to be given on postpartum prior to discharge.  RUBELLA: non-immune - MMR vaccine ordered to be given on postpartum prior to discharge.    LABS:   Lab Results   Component Value Date    WBC 12.20 (H) 03/21/2024    HGB 8.8 (L) 03/21/2024    HCT 25.7 (L) 03/21/2024    MCV 74.1 (L) 03/21/2024     03/21/2024    GLU 89 01/18/2023    CREATININE 0.53 (L) 03/20/2024    AST 11 03/20/2024    ALT 5 03/20/2024     Results from last 7 days   Lab Units 03/20/24  0045   ABO TYPING  O   RH TYPING  Positive   ANTIBODY SCREEN  Negative       DISCHARGE MEDICATIONS     Discharge Medications        New Medications        Instructions Start Date   ferrous sulfate 325 (65 FE) MG tablet   325 mg, Oral, Daily With Breakfast   Start Date: March 23, 2024     ibuprofen 600 MG tablet  Commonly known as: ADVIL,MOTRIN   600 mg, Oral, Every 6 Hours PRN             Continue These Medications         Instructions Start Date   famotidine 20 MG tablet  Commonly known as: Pepcid   20 mg, Oral, 2 Times Daily PRN      omeprazole 20 MG capsule  Commonly known as: priLOSEC   20 mg, Oral, Daily      Tylenol 325 MG tablet  Generic drug: acetaminophen   Every 4 Hours Scheduled      valACYclovir 500 MG tablet  Commonly known as: VALTREX   500 mg, Oral, Daily             Stop These Medications      Dupixent 300 MG/2ML solution pen-injector  Generic drug: Dupilumab     metroNIDAZOLE 0.75 % vaginal gel  Commonly known as: METROGEL     ondansetron 4 MG tablet  Commonly known as: ZOFRAN              DISCHARGE DISPOSITION: Home    DISCHARGE CONDITION: Stable    DISCHARGE DIET: Regular    ACTIVITY AT DISCHARGE: Pelvic rest    INFANT FEEDING PLANS: Bottle, baby remains in the NICU.     EDUCATION: Warning signs and symptoms given, no tub baths, nothing in the vagina for 6 weeks.     FOLLOW-UP APPOINTMENTS: Follow up with [facility]  in 4 to 6 weeks for routine postpartum visit.     Kalee Lopez CNM  03/22/24  09:41 EDT

## 2024-03-22 NOTE — LACTATION NOTE
This note was copied from a baby's chart.  Mom in NICU with baby and would like to latch baby.  NICU RN placed baby in Mom's arms.  Then, assisted baby with latching to R breast.  Baby latched well for 14 min. and mom said latch felt comfortable.  Then latched on L breast for 10 min. Baby was sleepy and needed frequent rousing.  Educated parents on feeding cues, frequency of feedings, when to expect milk supply.  Will start on HGP today if plans to board.  Will follow up with parents prior to discharge.

## 2024-03-22 NOTE — LACTATION NOTE
This note was copied from a baby's chart.  Mom changed her mind and has decided she wants to pump for her baby who is in the NICU. She has breast fed a few times. HGP initiated, Mom could only tolerate level 2 on pump and encouraged to keep turning it up in order to pump more milk but stay at her comfort level. After she pumped, 8mls was taken to NICU and was left a baby's bedside per RN. Discussed switching to maintenance mode as she begins to pump 15-20 mls of milk, pumping every 3hrs around the clock. She has Zomee and a hands-free pump at home.

## 2024-03-23 NOTE — LACTATION NOTE
This note was copied from a baby's chart.  P1 T - Dad requesting LC come to answer mom's pumping questions.  Mom just finished pumping 18ml of milk with HGP.  Additional colostrum collectors given.    Education provided:  Proper use & cleaning of pump including daily sterilization; frequency/duration of pumping; milk collection, storage/safe milk handling & labeling; breast massage. Verbalized understanding.     Mother denies questions/concerns at this time.  Encouraged to call for help when needed.  LC # on WB.

## 2024-03-26 NOTE — PROGRESS NOTES
"Continued Stay Note  [facility]     Patient Name: Travon Weller  MRN: [id number]  Today's Date: 3/26/2024    Admit Date: 3/20/2024    Plan: Infant may discharge to mother when medically ready. CATALINO Downs.   Discharge Plan       Row Name 03/26/24 1106       Plan    Plan Infant may discharge to mother when medically ready. CATALINO Downs.    Plan Comments Mother: Travon Weller, MRN [id number]; Infant: Rigo \"Braylen\" Janee, MRN [id number]. CSW reviewed cord toxicology for infant, and it was positive for Morphine; this is lab confirmed. Of note, mother received a morphine injection while on the Labor and Delivery unit. CPS reporting is not required at this time. CSW will remain available for psychosocial needs while infant is in the NICU. CATALINO Downs.                   Discharge Codes    No documentation.                 Expected Discharge Date and Time       Expected Discharge Date Expected Discharge Time    Mar 22, 2024               BHAVNA Ellison    "

## 2024-04-01 ENCOUNTER — MATERNAL SCREENING (OUTPATIENT)
Dept: CALL CENTER | Facility: HOSPITAL | Age: 19
End: 2024-04-01
Payer: COMMERCIAL

## 2024-04-01 DIAGNOSIS — A60.00 GENITAL HERPES SIMPLEX, UNSPECIFIED SITE: ICD-10-CM

## 2024-04-01 RX ORDER — VALACYCLOVIR HYDROCHLORIDE 500 MG/1
500 TABLET, FILM COATED ORAL DAILY
Qty: 30 TABLET | Refills: 1 | Status: SHIPPED | OUTPATIENT
Start: 2024-04-01 | End: 2024-05-31

## 2024-04-01 NOTE — OUTREACH NOTE
Maternal Screening Survey      Flowsheet Row Responses   [facility]   [facility]? Yes   Call start time 1448   Call end time 145   EPD Scale: Able to Laugh 0-->as much as she always could   EPD Scale: Looked Forward 0-->as much as she ever did   EPD Scale: Blamed Self 0-->no, never   EPD Scale: Been Anxious 0-->no, not at all   EPD Scale: Felt Panicky 0-->no, not at all   EPD Scale: Things Getting on Top 0-->no, has been coping as well as ever   EPD Scale: Difficulty Sleeping 0-->no, not at all   EPD Scale: Sad or Miserable 0-->no, not at all   EPD Scale: Crying 0-->no, never   EPD Scale: Thought of Harming Self 0-->never   [place]  Depression Score 0   Did any of your parents have problems with alcohol or drug use? No   Do any of your peers have problems with alcohol or drug use? No   Does your partner have problems with alcohol or drug use? No   Before you were pregnant did you have problems with alcohol or drug use? (past) No   In the past month, did you drink beer, wine, liquor or use any other drugs? (pregnancy) No   Maternal Screening call completed Yes   Call end time 1451              Mary GASTELUM - Registered Nurse

## 2024-04-01 NOTE — OUTREACH NOTE
Maternal Screening Survey      Flowsheet Row Responses   Facility patient discharged from? [place]   Attempt successful? No   Unsuccessful attempts Attempt 1              Mary GASTELUM - Registered Nurse

## 2024-04-08 ENCOUNTER — OFFICE VISIT (OUTPATIENT)
Dept: OBSTETRICS AND GYNECOLOGY | Facility: CLINIC | Age: 19
End: 2024-04-08
Payer: COMMERCIAL

## 2024-04-08 VITALS
DIASTOLIC BLOOD PRESSURE: 72 MMHG | BODY MASS INDEX: 25.33 KG/M2 | SYSTOLIC BLOOD PRESSURE: 106 MMHG | WEIGHT: 152 LBS | HEIGHT: 65 IN

## 2024-04-08 DIAGNOSIS — N89.8 VAGINAL ITCHING: ICD-10-CM

## 2024-04-08 DIAGNOSIS — A74.9 CHLAMYDIA: Primary | ICD-10-CM

## 2024-04-08 PROCEDURE — 99213 OFFICE O/P EST LOW 20 MIN: CPT | Performed by: NURSE PRACTITIONER

## 2024-04-08 PROCEDURE — 0503F POSTPARTUM CARE VISIT: CPT | Performed by: NURSE PRACTITIONER

## 2024-04-08 NOTE — PROGRESS NOTES
"Chief Complaint   Patient presents with    Follow-up     Pt here today for WOLFGANG      SUBJECTIVE:     Travon Weller is a 19 y.o. who presents for WOLFGANG. Positive chlamydia on 3/11/24.  She completed the full course of antibiotics. Denies current symptoms. She has not returned to intercourse since treatment. Recent  3/20/24. She has been doing well since. She does report some vaginal itching since delivery. She is Bottle feeding. Mood is stable  Past Medical History:   Diagnosis Date    Chlamydia 2023    took meds in February    Depression     no meds or counselor - mom is support    Eczema     better during pregnancy    Gonorrhea         Herpes     taking valtrex    Syphilis             Review of Systems   Constitutional:  Negative for chills, fatigue and fever.   Gastrointestinal:  Negative for abdominal distention and abdominal pain.   Genitourinary:  Negative for dysuria, pelvic pain, vaginal bleeding, vaginal discharge and vaginal pain.        + vaginal and vulvar itching       OBJECTIVE:   Vitals:    24 1140   BP: 106/72   Weight: 68.9 kg (152 lb)   Height: 165.1 cm (65\")        Physical Exam  Constitutional:       General: She is not in acute distress.     Appearance: Normal appearance. She is not ill-appearing, toxic-appearing or diaphoretic.   Genitourinary:      Bladder and urethral meatus normal.      No lesions in the vagina.      Right Labia: No rash, tenderness, lesions, skin changes or Bartholin's cyst.     Left Labia: No tenderness, lesions, skin changes, Bartholin's cyst or rash.     No labial fusion noted.      No inguinal adenopathy present in the right or left side.     Vaginal bleeding (oxidized) present.      No vaginal discharge, erythema, tenderness, ulceration or granulation tissue.      No vaginal prolapse present.     No vaginal atrophy present.       Right Adnexa: not tender, not full, not palpable, no mass present and not absent.     Left Adnexa: not tender, not full, " allergic reaction not palpable, no mass present and not absent.     No cervical motion tenderness, discharge, friability, lesion, polyp, nabothian cyst or eversion.      Uterus is not enlarged, fixed, tender, irregular or prolapsed.      No uterine mass detected.  Cardiovascular:      Rate and Rhythm: Normal rate.   Pulmonary:      Effort: Pulmonary effort is normal.   Abdominal:      General: There is no distension.      Palpations: Abdomen is soft. There is no mass.      Tenderness: There is no abdominal tenderness. There is no guarding.      Hernia: No hernia is present. There is no hernia in the left inguinal area or right inguinal area.   Musculoskeletal:         General: Normal range of motion.      Cervical back: Normal range of motion.   Lymphadenopathy:      Lower Body: No right inguinal adenopathy. No left inguinal adenopathy.   Neurological:      Mental Status: She is alert.   Skin:     General: Skin is warm and dry.   Vitals and nursing note reviewed.       Assessment/Plan    Diagnoses and all orders for this visit:    1. Chlamydia (Primary)  -     NuSwab VG+ - Swab, Vagina    2. Vaginal itching  -     NuSwab VG+ - Swab, Vagina    3. Postpartum follow-up      Vaginal cultures collected  Encouraged condoms with IC  No abnormal vaginal discharge or erythema noted.  Vulvar itching: no lesions or erythema. Recommend topical A&D BID, keep skin clean and dry  She is doing well postpartum  Mood is good  Continue pelvic rest  F/u in 4 weeks for PP visit    Return in about 4 weeks (around 5/6/2024) for With Dr Hernandez, postpartum follow up.     I spent 21 minutes caring for Travon on this date of service. This time includes time spent by me in the following activities: preparing for the visit, reviewing tests, obtaining and/or reviewing a separately obtained history, performing a medically appropriate examination and/or evaluation, counseling and educating the patient/family/caregiver, ordering medications, tests, or procedures,  referring and communicating with other health care professionals, documenting information in the medical record, and independently interpreting results and communicating that information with the patient/family/caregiver    Ruby Romo, NIALL  4/8/2024  12:16 EDT

## 2024-04-10 LAB
A VAGINAE DNA VAG QL NAA+PROBE: NORMAL SCORE
BVAB2 DNA VAG QL NAA+PROBE: NORMAL SCORE
C ALBICANS DNA VAG QL NAA+PROBE: NEGATIVE
C GLABRATA DNA VAG QL NAA+PROBE: NEGATIVE
C TRACH DNA VAG QL NAA+PROBE: NEGATIVE
MEGA1 DNA VAG QL NAA+PROBE: NORMAL SCORE
N GONORRHOEA DNA VAG QL NAA+PROBE: NEGATIVE
T VAGINALIS DNA VAG QL NAA+PROBE: NEGATIVE

## 2024-05-13 ENCOUNTER — POSTPARTUM VISIT (OUTPATIENT)
Dept: OBSTETRICS AND GYNECOLOGY | Facility: CLINIC | Age: 19
End: 2024-05-13
Payer: COMMERCIAL

## 2024-05-13 VITALS — HEIGHT: 65 IN | BODY MASS INDEX: 25.83 KG/M2 | WEIGHT: 155 LBS

## 2024-05-13 PROCEDURE — 0503F POSTPARTUM CARE VISIT: CPT | Performed by: OBSTETRICS & GYNECOLOGY

## 2024-05-13 RX ORDER — MEDROXYPROGESTERONE ACETATE 150 MG/ML
150 INJECTION, SUSPENSION INTRAMUSCULAR
Qty: 1 ML | Refills: 3 | Status: SHIPPED | OUTPATIENT
Start: 2024-05-13

## 2024-05-14 NOTE — PROGRESS NOTES
Matthias Weller is a 19 y.o. female who presents for a postpartum visit. She is 7 weeks postpartum following a spontaneous vaginal delivery. I have fully reviewed the prenatal and intrapartum course. The delivery was at 39 gestational weeks. Outcome: spontaneous vaginal delivery. Anesthesia: regional. Postpartum course has been uncomplicated. Baby's course has been uncomplicated. Baby is feeding by bottle. Bleeding  is scant . Bowel function is normal. Bladder function is normal. Patient is not sexually active. Contraception method is  desired as Depo Provera . Postpartum depression screening: positive -- patient with previous history of depression.    The following portions of the patient's history were reviewed and updated as appropriate: She  has a past medical history of Chlamydia (08/14/2023), Depression, Eczema, Gonorrhea, Herpes, and Syphilis.  She  reports that she has never smoked. She has never been exposed to tobacco smoke. She has never used smokeless tobacco. She reports that she does not currently use alcohol. She reports that she does not currently use drugs after having used the following drugs: Marijuana.  Current Outpatient Medications   Medication Sig Dispense Refill    acetaminophen (Tylenol) 325 MG tablet Every 4 (Four) Hours. (Patient not taking: Reported on 4/8/2024)      famotidine (Pepcid) 20 MG tablet Take 1 tablet by mouth 2 (Two) Times a Day As Needed for Heartburn. (Patient not taking: Reported on 4/8/2024) 30 tablet 1    ferrous sulfate 325 (65 FE) MG tablet Take 1 tablet by mouth Daily With Breakfast. (Patient not taking: Reported on 5/13/2024) 30 tablet 1    ibuprofen (ADVIL,MOTRIN) 600 MG tablet Take 1 tablet by mouth Every 6 (Six) Hours As Needed for Mild Pain (First Line: Mild pain.). (Patient not taking: Reported on 4/8/2024) 60 tablet 0    medroxyPROGESTERone (Depo-Provera) 150 MG/ML injection Inject 1 mL into the appropriate muscle as directed by prescriber Every 3  "(Three) Months. 1 mL 3    omeprazole (priLOSEC) 20 MG capsule Take 1 capsule by mouth Daily. (Patient not taking: Reported on 4/8/2024) 30 capsule 1    sertraline (Zoloft) 50 MG tablet Take 1 tablet by mouth Daily. 90 tablet 3    valACYclovir (VALTREX) 500 MG tablet Take 1 tablet by mouth Daily for 60 days. (Patient not taking: Reported on 4/8/2024) 30 tablet 1     No current facility-administered medications for this visit.     She is allergic to penicillins and amoxicillin..    Review of Systems  Constitutional: negative for chills and fevers  Gastrointestinal: negative for nausea and vomiting  Genitourinary:negative for dysuria and frequency  Integument/breast: negative for breast lump and breast tenderness  Behavioral/Psych: negative for anxiety and depression    Objective   Ht 165.1 cm (65\")   Wt 70.3 kg (155 lb)   LMP 06/17/2023   Breastfeeding No   BMI 25.79 kg/m²    General:  alert, appears stated age, and cooperative    Breasts:  inspection negative, no nipple discharge or bleeding, no masses or nodularity palpable   Lungs: clear to auscultation bilaterally   Heart:  regular rate and rhythm   Abdomen: normal findings: soft, non-tender    Vulva:  negative for swelling bilaterally   Vagina: normal vagina   Cervix:  no cervical motion tenderness   Corpus: normal size, contour, position, consistency, mobility, non-tender   Adnexa:  no mass, fullness, tenderness   Rectal Exam: Not performed.     Assessment & Plan   Normal postpartum exam. Pap smear not done at today's visit.    1. Contraception: Depo-Provera injections.  This was called in to patient's pharmacy.  2. Depression.  Patient with previous history of depression but not controlled with Prozac medication.  Will start Zoloft.  3. Follow up in: 1  year  or as needed.    Yoav Hernandez MD           "

## 2024-06-17 ENCOUNTER — CLINICAL SUPPORT (OUTPATIENT)
Dept: OBSTETRICS AND GYNECOLOGY | Facility: CLINIC | Age: 19
End: 2024-06-17
Payer: COMMERCIAL

## 2024-06-17 VITALS — WEIGHT: 161 LBS | BODY MASS INDEX: 26.79 KG/M2

## 2024-06-17 DIAGNOSIS — Z30.013 INITIATION OF DEPO PROVERA: Primary | ICD-10-CM

## 2024-06-17 LAB
B-HCG UR QL: NEGATIVE
EXPIRATION DATE: NORMAL
INTERNAL NEGATIVE CONTROL: NEGATIVE
INTERNAL POSITIVE CONTROL: POSITIVE
Lab: NORMAL

## 2024-06-17 PROCEDURE — 96372 THER/PROPH/DIAG INJ SC/IM: CPT | Performed by: OBSTETRICS & GYNECOLOGY

## 2024-06-17 PROCEDURE — 81025 URINE PREGNANCY TEST: CPT | Performed by: OBSTETRICS & GYNECOLOGY

## 2024-06-17 RX ORDER — MEDROXYPROGESTERONE ACETATE 150 MG/ML
150 INJECTION, SUSPENSION INTRAMUSCULAR ONCE
Status: COMPLETED | OUTPATIENT
Start: 2024-06-17 | End: 2024-06-17

## 2024-06-17 RX ADMIN — MEDROXYPROGESTERONE ACETATE 150 MG: 150 INJECTION, SUSPENSION INTRAMUSCULAR at 10:49

## 2024-06-17 NOTE — PROGRESS NOTES
Pt here today for initial depo injection pt supplied. Tolerated without a reaction, Rt Deltoid. Recent intercourse with protection, urine HCG neg today.   NDC:98976-976-82  LOT:[id number]  EXP:07/2026

## 2024-06-24 ENCOUNTER — OFFICE VISIT (OUTPATIENT)
Dept: FAMILY MEDICINE CLINIC | Facility: CLINIC | Age: 19
End: 2024-06-24
Payer: COMMERCIAL

## 2024-06-24 VITALS
HEIGHT: 65 IN | BODY MASS INDEX: 26.82 KG/M2 | WEIGHT: 161 LBS | HEART RATE: 107 BPM | TEMPERATURE: 98 F | SYSTOLIC BLOOD PRESSURE: 110 MMHG | DIASTOLIC BLOOD PRESSURE: 76 MMHG | OXYGEN SATURATION: 99 %

## 2024-06-24 DIAGNOSIS — M79.645 PAIN OF LEFT THUMB: Primary | ICD-10-CM

## 2024-06-24 PROCEDURE — 99213 OFFICE O/P EST LOW 20 MIN: CPT

## 2024-06-24 RX ORDER — NAPROXEN 500 MG/1
500 TABLET ORAL 2 TIMES DAILY PRN
Qty: 28 TABLET | Refills: 0 | Status: SHIPPED | OUTPATIENT
Start: 2024-06-24

## 2024-06-24 NOTE — PROGRESS NOTES
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 female who presents to the office today for left thumb pain. She is new to me but a patient of NIALL Israel. She states her pain started about a week ago. She denies any injury preceding the pain. 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 She takes ibuprofen as needed which does not help. She has also tried to wrap the thumb which did not help.       Objective   Vital Signs:  /76   Pulse 107   Temp 98 °F (36.7 °C)   Ht 165.1 cm (65\")   Wt 73 kg (161 lb)   SpO2 99%   BMI 26.79 kg/m²   Estimated body mass index is 26.79 kg/m² as calculated from the following:    Height as of this encounter: 165.1 cm (65\").    Weight as of this encounter: 73 kg (161 lb).  Facility age limit for growth %tomasa is 19 years.          Physical Exam  Constitutional:       Appearance: Normal appearance.   Cardiovascular:      Rate and Rhythm: Normal rate and regular rhythm.      Heart sounds: Normal heart sounds.   Pulmonary:      Effort: Pulmonary effort is normal.      Breath sounds: Normal breath sounds.   Musculoskeletal:      Left hand: Tenderness (over the first dorsal interosseous muscle) present. No swelling or bony tenderness. Normal range of motion.      Comments: Tenderness over the first dorsal interosseous muscle.    Neurological:      Mental Status: She is alert.   Psychiatric:         Mood and Affect: Mood normal.        Result Review :    The following data was reviewed by: NIALL Diego on 06/24/2024:  Common labs          12/18/2023    13:32 3/20/2024    00:45 3/21/2024    06:44   Common Labs   Glucose 77  113     BUN 4  6     Creatinine 0.47  0.53     Sodium 136  140  "    Potassium 3.9  3.6     Chloride 104  107     Calcium 8.9  8.9     Total Protein 6.9      Albumin 4.0  3.8     Total Bilirubin 0.4  0.3     Alkaline Phosphatase 79  201     AST (SGOT) 13  11     ALT (SGPT) 9  5     WBC 12.4  11.52  12.20    Hemoglobin 12.4  10.9  8.8    Hematocrit 35.2  32.0  25.7    Platelets 335  343  253                   Assessment and Plan     Diagnoses and all orders for this visit:    1. Pain of left thumb (Primary)  Assessment & Plan:  Instructed her to rest and ice the area.     Orders:  -     naproxen (NAPROSYN) 500 MG tablet; Take 1 tablet by mouth 2 (Two) Times a Day As Needed for Mild Pain.  Dispense: 28 tablet; Refill: 0  -     Ambulatory Referral to Physical Therapy    Other orders  -     Cancel: POCT urinalysis dipstick, automated    Patient agrees with plan of care and understands instructions. Call if worsening symptoms or any problems or concerns.            Follow Up     No follow-ups on file.  Patient was given instructions and counseling regarding her condition or for health maintenance advice. Please see specific information pulled into the AVS if appropriate.

## 2024-06-28 PROBLEM — M79.645 PAIN OF LEFT THUMB: Status: ACTIVE | Noted: 2024-06-28

## 2024-09-11 ENCOUNTER — HOSPITAL ENCOUNTER (EMERGENCY)
Facility: HOSPITAL | Age: 19
Discharge: HOME | End: 2024-09-11
Attending: EMERGENCY MEDICINE
Payer: COMMERCIAL

## 2024-09-11 ENCOUNTER — OFFICE VISIT (OUTPATIENT)
Dept: FAMILY MEDICINE CLINIC | Facility: CLINIC | Age: 19
End: 2024-09-11
Payer: COMMERCIAL

## 2024-09-11 ENCOUNTER — APPOINTMENT (OUTPATIENT)
Dept: CT IMAGING | Facility: HOSPITAL | Age: 19
End: 2024-09-11
Payer: COMMERCIAL

## 2024-09-11 VITALS
TEMPERATURE: 98.1 F | HEART RATE: 105 BPM | BODY MASS INDEX: 28.82 KG/M2 | DIASTOLIC BLOOD PRESSURE: 82 MMHG | SYSTOLIC BLOOD PRESSURE: 114 MMHG | OXYGEN SATURATION: 99 % | RESPIRATION RATE: 16 BRPM | WEIGHT: 173 LBS | HEIGHT: 65 IN

## 2024-09-11 VITALS
DIASTOLIC BLOOD PRESSURE: 79 MMHG | OXYGEN SATURATION: 98 % | HEART RATE: 89 BPM | SYSTOLIC BLOOD PRESSURE: 119 MMHG | BODY MASS INDEX: 28.49 KG/M2 | HEIGHT: 65 IN | RESPIRATION RATE: 15 BRPM | TEMPERATURE: 99 F | WEIGHT: 171 LBS

## 2024-09-11 DIAGNOSIS — R10.9 ACUTE ABDOMINAL PAIN: Primary | ICD-10-CM

## 2024-09-11 DIAGNOSIS — K29.00 ACUTE GASTRITIS WITHOUT HEMORRHAGE, UNSPECIFIED GASTRITIS TYPE: ICD-10-CM

## 2024-09-11 DIAGNOSIS — K52.9 GASTROENTERITIS: ICD-10-CM

## 2024-09-11 DIAGNOSIS — R10.13 EPIGASTRIC PAIN: Primary | ICD-10-CM

## 2024-09-11 LAB
ALBUMIN SERPL-MCNC: 4.5 G/DL (ref 3.5–5.2)
ALBUMIN/GLOB SERPL: 1.6 G/DL
ALP SERPL-CCNC: 96 U/L (ref 39–117)
ALT SERPL W P-5'-P-CCNC: 18 U/L (ref 1–33)
ANION GAP SERPL CALCULATED.3IONS-SCNC: 14 MMOL/L (ref 5–15)
AST SERPL-CCNC: 11 U/L (ref 1–32)
BACTERIA UR QL AUTO: ABNORMAL /HPF
BASOPHILS # BLD AUTO: 0.03 10*3/MM3 (ref 0–0.2)
BASOPHILS NFR BLD AUTO: 0.3 % (ref 0–1.5)
BILIRUB SERPL-MCNC: 0.3 MG/DL (ref 0–1.2)
BILIRUB UR QL STRIP: NEGATIVE
BUN SERPL-MCNC: 10 MG/DL (ref 6–20)
BUN/CREAT SERPL: 15.4 (ref 7–25)
CALCIUM SPEC-SCNC: 9.5 MG/DL (ref 8.6–10.5)
CHLORIDE SERPL-SCNC: 103 MMOL/L (ref 98–107)
CLARITY UR: CLEAR
CO2 SERPL-SCNC: 22 MMOL/L (ref 22–29)
COLOR UR: YELLOW
CREAT SERPL-MCNC: 0.65 MG/DL (ref 0.57–1)
DEPRECATED RDW RBC AUTO: 40.2 FL (ref 37–54)
EGFRCR SERPLBLD CKD-EPI 2021: 129.4 ML/MIN/1.73
EOSINOPHIL # BLD AUTO: 0.16 10*3/MM3 (ref 0–0.4)
EOSINOPHIL NFR BLD AUTO: 1.5 % (ref 0.3–6.2)
ERYTHROCYTE [DISTWIDTH] IN BLOOD BY AUTOMATED COUNT: 14.6 % (ref 12.3–15.4)
GLOBULIN UR ELPH-MCNC: 2.8 GM/DL
GLUCOSE SERPL-MCNC: 94 MG/DL (ref 65–99)
GLUCOSE UR STRIP-MCNC: NEGATIVE MG/DL
HCG SERPL QL: NEGATIVE
HCT VFR BLD AUTO: 39.2 % (ref 34–46.6)
HGB BLD-MCNC: 13 G/DL (ref 12–15.9)
HGB UR QL STRIP.AUTO: NEGATIVE
HYALINE CASTS UR QL AUTO: ABNORMAL /LPF
IMM GRANULOCYTES # BLD AUTO: 0.02 10*3/MM3 (ref 0–0.05)
IMM GRANULOCYTES NFR BLD AUTO: 0.2 % (ref 0–0.5)
KETONES UR QL STRIP: NEGATIVE
LEUKOCYTE ESTERASE UR QL STRIP.AUTO: ABNORMAL
LIPASE SERPL-CCNC: 43 U/L (ref 13–60)
LYMPHOCYTES # BLD AUTO: 4.31 10*3/MM3 (ref 0.7–3.1)
LYMPHOCYTES NFR BLD AUTO: 39.6 % (ref 19.6–45.3)
MCH RBC QN AUTO: 25.7 PG (ref 26.6–33)
MCHC RBC AUTO-ENTMCNC: 33.2 G/DL (ref 31.5–35.7)
MCV RBC AUTO: 77.5 FL (ref 79–97)
MONOCYTES # BLD AUTO: 0.81 10*3/MM3 (ref 0.1–0.9)
MONOCYTES NFR BLD AUTO: 7.4 % (ref 5–12)
NEUTROPHILS NFR BLD AUTO: 5.55 10*3/MM3 (ref 1.7–7)
NEUTROPHILS NFR BLD AUTO: 51 % (ref 42.7–76)
NITRITE UR QL STRIP: NEGATIVE
NRBC BLD AUTO-RTO: 0 /100 WBC (ref 0–0.2)
PH UR STRIP.AUTO: 6 [PH] (ref 5–8)
PLATELET # BLD AUTO: 390 10*3/MM3 (ref 140–450)
PMV BLD AUTO: 9.2 FL (ref 6–12)
POTASSIUM SERPL-SCNC: 3.7 MMOL/L (ref 3.5–5.2)
PROT SERPL-MCNC: 7.3 G/DL (ref 6–8.5)
PROT UR QL STRIP: ABNORMAL
RBC # BLD AUTO: 5.06 10*6/MM3 (ref 3.77–5.28)
RBC # UR STRIP: ABNORMAL /HPF
REF LAB TEST METHOD: ABNORMAL
SODIUM SERPL-SCNC: 139 MMOL/L (ref 136–145)
SP GR UR STRIP: 1.03 (ref 1–1.03)
SQUAMOUS #/AREA URNS HPF: ABNORMAL /HPF
UROBILINOGEN UR QL STRIP: ABNORMAL
WBC # UR STRIP: ABNORMAL /HPF
WBC NRBC COR # BLD AUTO: 10.88 10*3/MM3 (ref 3.4–10.8)

## 2024-09-11 PROCEDURE — 85025 COMPLETE CBC W/AUTO DIFF WBC: CPT | Performed by: EMERGENCY MEDICINE

## 2024-09-11 PROCEDURE — 25510000001 IOPAMIDOL 61 % SOLUTION: Performed by: EMERGENCY MEDICINE

## 2024-09-11 PROCEDURE — 99213 OFFICE O/P EST LOW 20 MIN: CPT | Performed by: STUDENT IN AN ORGANIZED HEALTH CARE EDUCATION/TRAINING PROGRAM

## 2024-09-11 PROCEDURE — 74177 CT ABD & PELVIS W/CONTRAST: CPT

## 2024-09-11 PROCEDURE — 83690 ASSAY OF LIPASE: CPT | Performed by: EMERGENCY MEDICINE

## 2024-09-11 PROCEDURE — 84703 CHORIONIC GONADOTROPIN ASSAY: CPT | Performed by: EMERGENCY MEDICINE

## 2024-09-11 PROCEDURE — 81001 URINALYSIS AUTO W/SCOPE: CPT | Performed by: EMERGENCY MEDICINE

## 2024-09-11 PROCEDURE — 80053 COMPREHEN METABOLIC PANEL: CPT | Performed by: EMERGENCY MEDICINE

## 2024-09-11 PROCEDURE — 99285 EMERGENCY DEPT VISIT HI MDM: CPT

## 2024-09-11 RX ORDER — CLINDAMYCIN HCL 300 MG
CAPSULE ORAL
COMMUNITY
Start: 2024-09-09

## 2024-09-11 RX ORDER — IOPAMIDOL 612 MG/ML
100 INJECTION, SOLUTION INTRAVASCULAR
Status: COMPLETED | OUTPATIENT
Start: 2024-09-11 | End: 2024-09-11

## 2024-09-11 RX ORDER — SODIUM CHLORIDE 0.9 % (FLUSH) 0.9 %
10 SYRINGE (ML) INJECTION AS NEEDED
Status: DISCONTINUED | OUTPATIENT
Start: 2024-09-11 | End: 2024-09-12 | Stop reason: HOSPADM

## 2024-09-11 RX ORDER — ALUMINA, MAGNESIA, AND SIMETHICONE 2400; 2400; 240 MG/30ML; MG/30ML; MG/30ML
15 SUSPENSION ORAL ONCE
Status: COMPLETED | OUTPATIENT
Start: 2024-09-11 | End: 2024-09-11

## 2024-09-11 RX ORDER — LOPERAMIDE HCL 2 MG
2 CAPSULE ORAL 4 TIMES DAILY PRN
Qty: 21 CAPSULE | Refills: 0 | Status: SHIPPED | OUTPATIENT
Start: 2024-09-11

## 2024-09-11 RX ORDER — ONDANSETRON 4 MG/1
4 TABLET, FILM COATED ORAL EVERY 8 HOURS PRN
Qty: 21 TABLET | Refills: 0 | Status: SHIPPED | OUTPATIENT
Start: 2024-09-11

## 2024-09-11 RX ORDER — EPINEPHRINE 0.3 MG/.3ML
INJECTION SUBCUTANEOUS
COMMUNITY
Start: 2024-09-04

## 2024-09-11 RX ORDER — FAMOTIDINE 20 MG/1
20 TABLET, FILM COATED ORAL 2 TIMES DAILY PRN
Qty: 30 TABLET | Refills: 1 | Status: SHIPPED | OUTPATIENT
Start: 2024-09-11 | End: 2025-09-11

## 2024-09-11 RX ADMIN — IOPAMIDOL 85 ML: 612 INJECTION, SOLUTION INTRAVENOUS at 22:08

## 2024-09-11 RX ADMIN — ALUMINUM HYDROXIDE, MAGNESIUM HYDROXIDE, DIMETHICONE 15 ML: 400; 400; 40 SUSPENSION ORAL at 21:57

## 2024-09-11 NOTE — ASSESSMENT & PLAN NOTE
Possible acute cholecystitis.    Instructed patient to immediately go to the emergency room for further evaluation and treatment, patient voiced understanding.

## 2024-09-11 NOTE — PROGRESS NOTES
Chief Complaint  Abdominal Pain (Started Thursday.  Ate at restaurant; had heartburn.  Pain started at top of stomach and radiated around the stomach.  Been alternating between Ibuprofen and Tylenol. ) and Heartburn (Nausea.  No vomiting.  Constant with any type of food.  Slight headaches.  )    Subjective        Travon Weller presents to [facility]  History of Present Illness    18yo WF who usually follows NIALL Israel he is here for acute complaint of abdominal pain.  Pt reports she ate out at [employer] last Thursday and pt c/o epigastric abdominal pain that started same night then progressed to lower abd as well.  Patient reports several of her family members ate last week at [facility] however none of the other family members got sick.  Patient reports she ate steak, macaroni and cheese and pizza.  Pt c/o heartburn sx when she eats something since then.    Denied constipation, vomiting.  +nausea. But reports she has been taking ibuprofen.    Abdominal Pain  This is a new problem. The current episode started in the past 7 days. The onset quality is sudden. The problem occurs intermittently. The most recent episode lasted 6 days. The problem has been gradually worsening since onset. The pain is located in the generalized abdominal region and epigastric region. The pain is at a severity of 8/10. The pain is moderate. The quality of the pain is described as aching and sharp. The pain radiates to the back. Associated symptoms include anorexia, belching, diarrhea, flatus and nausea. Pertinent negatives include no anxiety, arthralgias, constipation, dysuria, fever, headaches, hematochezia, hematuria, melena, myalgias, rash, sore throat or vomiting. Nothing relieves the symptoms.   Heartburn  She complains of abdominal pain, belching, heartburn and nausea. She reports no sore throat. Pertinent negatives include no melena.       Objective   Vital Signs:  /82 (BP  "Location: Left arm, Patient Position: Sitting, Cuff Size: Adult)   Pulse 105   Temp 98.1 °F (36.7 °C) (Temporal)   Resp 16   Ht 165.1 cm (65\")   Wt 78.5 kg (173 lb)   SpO2 99%   BMI 28.79 kg/m²   Estimated body mass index is 28.79 kg/m² as calculated from the following:    Height as of this encounter: 165.1 cm (65\").    Weight as of this encounter: 78.5 kg (173 lb).  Facility age limit for growth %tomasa is 20 years.            Physical Exam  Constitutional:       Appearance: Normal appearance.   HENT:      Head: Normocephalic and atraumatic.   Eyes:      Conjunctiva/sclera: Conjunctivae normal.   Cardiovascular:      Rate and Rhythm: Normal rate and regular rhythm.      Heart sounds: Normal heart sounds.   Pulmonary:      Effort: Pulmonary effort is normal.      Breath sounds: Normal breath sounds.   Abdominal:      General: Bowel sounds are normal.      Palpations: Abdomen is soft. There is no shifting dullness or mass.      Tenderness: There is abdominal tenderness in the right upper quadrant, right lower quadrant and epigastric area. There is no guarding. Negative signs include Hogan's sign, Rovsing's sign and obturator sign.      Comments: Non-tender   Skin:     General: Skin is warm.   Neurological:      General: No focal deficit present.      Mental Status: She is alert and oriented to person, place, and time.   Psychiatric:         Mood and Affect: Mood normal.         Behavior: Behavior normal.        Result Review :    The following data was reviewed by: NIALL Molina on 09/11/2024:  CMP          12/18/2023    13:32 3/20/2024    00:45   CMP   Glucose 77  113    BUN 4  6    Creatinine 0.47  0.53    EGFR  136.0    Sodium 136  140    Potassium 3.9  3.6    Chloride 104  107    Calcium 8.9  8.9    Total Protein 6.9     Total Protein  7.3    Albumin 4.0  3.8    Globulin 2.9     Globulin  3.5    Total Bilirubin 0.4  0.3    Alkaline Phosphatase 79  201    AST (SGOT) 13  11    ALT (SGPT) 9  5  "   Albumin/Globulin Ratio  1.1    BUN/Creatinine Ratio 9  11.3    Anion Gap  14.7      CBC          12/18/2023    13:32 3/20/2024    00:45 3/21/2024    06:44   CBC   WBC 12.4  11.52  12.20    RBC 4.18  4.28  3.47    Hemoglobin 12.4  10.9  8.8    Hematocrit 35.2  32.0  25.7    MCV 84  74.8  74.1    MCH 29.7  25.5  25.4    MCHC 35.2  34.1  34.2    RDW 12.5  14.4  14.4    Platelets 335  343  253                             Assessment and Plan     Diagnoses and all orders for this visit:    1. Acute abdominal pain (Primary)  Assessment & Plan:  Possible acute cholecystitis.    Instructed patient to immediately go to the emergency room for further evaluation and treatment, patient voiced understanding.        2. Acute gastritis without hemorrhage, unspecified gastritis type  -     H. Pylori Breath Test - Breath, Lung    3. Gastroenteritis  -     famotidine (Pepcid) 20 MG tablet; Take 1 tablet by mouth 2 (Two) Times a Day As Needed for Heartburn.  Dispense: 30 tablet; Refill: 1  -     loperamide (IMODIUM) 2 MG capsule; Take 1 capsule by mouth 4 (Four) Times a Day As Needed for Diarrhea.  Dispense: 21 capsule; Refill: 0  -     ondansetron (ZOFRAN) 4 MG tablet; Take 1 tablet by mouth Every 8 (Eight) Hours As Needed for Nausea or Vomiting.  Dispense: 21 tablet; Refill: 0      Instructed patient to follow-up with PCP after ER discharge.       Follow Up     Return in about 1 week (around 9/18/2024) for Next scheduled follow up.  Patient was given instructions and counseling regarding her condition or for health maintenance advice. Please see specific information pulled into the AVS if appropriate.
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
## 2024-09-16 ENCOUNTER — CLINICAL SUPPORT (OUTPATIENT)
Dept: OBSTETRICS AND GYNECOLOGY | Facility: CLINIC | Age: 19
End: 2024-09-16
Payer: COMMERCIAL

## 2024-09-16 VITALS
HEIGHT: 65 IN | HEART RATE: 85 BPM | BODY MASS INDEX: 28.79 KG/M2 | WEIGHT: 172.8 LBS | SYSTOLIC BLOOD PRESSURE: 108 MMHG | DIASTOLIC BLOOD PRESSURE: 70 MMHG

## 2024-09-16 DIAGNOSIS — Z30.42 ENCOUNTER FOR MANAGEMENT AND INJECTION OF DEPO-PROVERA: Primary | ICD-10-CM

## 2024-09-16 PROCEDURE — 96372 THER/PROPH/DIAG INJ SC/IM: CPT | Performed by: OBSTETRICS & GYNECOLOGY

## 2024-09-16 RX ORDER — MEDROXYPROGESTERONE ACETATE 150 MG/ML
150 INJECTION, SUSPENSION INTRAMUSCULAR ONCE
Status: COMPLETED | OUTPATIENT
Start: 2024-09-16 | End: 2024-09-16

## 2024-09-16 RX ADMIN — MEDROXYPROGESTERONE ACETATE 150 MG: 150 INJECTION, SUSPENSION INTRAMUSCULAR at 13:06

## 2024-09-17 ENCOUNTER — OFFICE VISIT (OUTPATIENT)
Dept: FAMILY MEDICINE CLINIC | Facility: CLINIC | Age: 19
End: 2024-09-17
Payer: COMMERCIAL

## 2024-09-17 VITALS
HEART RATE: 97 BPM | RESPIRATION RATE: 16 BRPM | TEMPERATURE: 97.8 F | OXYGEN SATURATION: 99 % | DIASTOLIC BLOOD PRESSURE: 70 MMHG | HEIGHT: 65 IN | SYSTOLIC BLOOD PRESSURE: 118 MMHG | BODY MASS INDEX: 29.19 KG/M2 | WEIGHT: 175.2 LBS

## 2024-09-17 DIAGNOSIS — R10.9 ACUTE ABDOMINAL PAIN: Primary | ICD-10-CM

## 2024-09-17 DIAGNOSIS — N30.00 ACUTE CYSTITIS WITHOUT HEMATURIA: ICD-10-CM

## 2024-09-17 DIAGNOSIS — K29.00 ACUTE GASTRITIS WITHOUT HEMORRHAGE, UNSPECIFIED GASTRITIS TYPE: ICD-10-CM

## 2024-09-17 PROBLEM — R35.0 URINARY FREQUENCY: Status: RESOLVED | Noted: 2022-03-23 | Resolved: 2024-09-17

## 2024-09-17 PROBLEM — R11.2 NAUSEA AND VOMITING: Status: RESOLVED | Noted: 2022-12-19 | Resolved: 2024-09-17

## 2024-09-17 PROBLEM — R30.0 DYSURIA: Status: RESOLVED | Noted: 2022-04-27 | Resolved: 2024-09-17

## 2024-09-17 PROBLEM — Z00.00 ANNUAL PHYSICAL EXAM: Status: RESOLVED | Noted: 2022-03-24 | Resolved: 2024-09-17

## 2024-09-17 PROCEDURE — 99214 OFFICE O/P EST MOD 30 MIN: CPT | Performed by: STUDENT IN AN ORGANIZED HEALTH CARE EDUCATION/TRAINING PROGRAM

## 2024-09-17 RX ORDER — LEVOFLOXACIN 500 MG/1
500 TABLET, FILM COATED ORAL DAILY
Qty: 5 TABLET | Refills: 0 | Status: SHIPPED | OUTPATIENT
Start: 2024-09-17

## 2024-09-18 LAB — UREA BREATH TEST QL: NEGATIVE

## 2024-09-24 ENCOUNTER — OFFICE VISIT (OUTPATIENT)
Dept: GASTROENTEROLOGY | Facility: CLINIC | Age: 19
End: 2024-09-24
Payer: COMMERCIAL

## 2024-09-24 ENCOUNTER — HOSPITAL ENCOUNTER (OUTPATIENT)
Facility: HOSPITAL | Age: 19
Discharge: HOME | End: 2024-09-24
Payer: COMMERCIAL

## 2024-09-24 ENCOUNTER — LAB (OUTPATIENT)
Dept: LAB | Facility: HOSPITAL | Age: 19
End: 2024-09-24
Payer: COMMERCIAL

## 2024-09-24 VITALS
SYSTOLIC BLOOD PRESSURE: 102 MMHG | WEIGHT: 175.1 LBS | BODY MASS INDEX: 29.17 KG/M2 | TEMPERATURE: 97.1 F | HEART RATE: 79 BPM | DIASTOLIC BLOOD PRESSURE: 62 MMHG | HEIGHT: 65 IN | OXYGEN SATURATION: 99 %

## 2024-09-24 DIAGNOSIS — R19.8 IRREGULAR BOWEL HABITS: ICD-10-CM

## 2024-09-24 DIAGNOSIS — R11.0 NAUSEA WITHOUT VOMITING: ICD-10-CM

## 2024-09-24 DIAGNOSIS — D50.9 IRON DEFICIENCY ANEMIA, UNSPECIFIED IRON DEFICIENCY ANEMIA TYPE: Primary | ICD-10-CM

## 2024-09-24 DIAGNOSIS — R10.9 ACUTE ABDOMINAL PAIN: ICD-10-CM

## 2024-09-24 DIAGNOSIS — R14.0 ABDOMINAL BLOATING: ICD-10-CM

## 2024-09-24 DIAGNOSIS — R10.11 BILATERAL UPPER ABDOMINAL PAIN: ICD-10-CM

## 2024-09-24 DIAGNOSIS — R10.12 BILATERAL UPPER ABDOMINAL PAIN: ICD-10-CM

## 2024-09-24 LAB
CRP SERPL-MCNC: 0.4 MG/DL (ref 0–0.5)
ERYTHROCYTE [SEDIMENTATION RATE] IN BLOOD: 12 MM/HR (ref 0–20)
IRON 24H UR-MRATE: 25 MCG/DL (ref 37–145)
IRON SATN MFR SERPL: 6 % (ref 20–50)
TIBC SERPL-MCNC: 416 MCG/DL (ref 298–536)
TRANSFERRIN SERPL-MCNC: 279 MG/DL (ref 200–360)
TSH SERPL DL<=0.05 MIU/L-ACNC: 1.9 UIU/ML (ref 0.27–4.2)
VIT B12 BLD-MCNC: 430 PG/ML (ref 211–946)

## 2024-09-24 PROCEDURE — 86364 TISS TRNSGLTMNASE EA IG CLAS: CPT | Performed by: NURSE PRACTITIONER

## 2024-09-24 PROCEDURE — 82784 ASSAY IGA/IGD/IGG/IGM EACH: CPT | Performed by: NURSE PRACTITIONER

## 2024-09-24 PROCEDURE — 86140 C-REACTIVE PROTEIN: CPT | Performed by: NURSE PRACTITIONER

## 2024-09-24 PROCEDURE — 82607 VITAMIN B-12: CPT | Performed by: NURSE PRACTITIONER

## 2024-09-24 PROCEDURE — 99214 OFFICE O/P EST MOD 30 MIN: CPT | Performed by: NURSE PRACTITIONER

## 2024-09-24 PROCEDURE — 86258 DGP ANTIBODY EACH IG CLASS: CPT | Performed by: NURSE PRACTITIONER

## 2024-09-24 PROCEDURE — 84466 ASSAY OF TRANSFERRIN: CPT | Performed by: NURSE PRACTITIONER

## 2024-09-24 PROCEDURE — 83540 ASSAY OF IRON: CPT | Performed by: NURSE PRACTITIONER

## 2024-09-24 PROCEDURE — 36415 COLL VENOUS BLD VENIPUNCTURE: CPT | Performed by: NURSE PRACTITIONER

## 2024-09-24 PROCEDURE — 76700 US EXAM ABDOM COMPLETE: CPT

## 2024-09-24 PROCEDURE — 85652 RBC SED RATE AUTOMATED: CPT | Performed by: NURSE PRACTITIONER

## 2024-09-24 PROCEDURE — 84443 ASSAY THYROID STIM HORMONE: CPT | Performed by: NURSE PRACTITIONER

## 2024-09-24 RX ORDER — DICYCLOMINE HCL 20 MG
20 TABLET ORAL EVERY 8 HOURS PRN
Qty: 30 TABLET | Refills: 0 | Status: SHIPPED | OUTPATIENT
Start: 2024-09-24 | End: 2024-10-24

## 2024-09-25 ENCOUNTER — PATIENT ROUNDING (BHMG ONLY) (OUTPATIENT)
Dept: GASTROENTEROLOGY | Facility: CLINIC | Age: 19
End: 2024-09-25
Payer: COMMERCIAL

## 2024-09-25 LAB
GLIADIN PEPTIDE IGA SER-ACNC: 3 UNITS (ref 0–19)
IGA SERPL-MCNC: 238 MG/DL (ref 87–352)
TTG IGA SER-ACNC: <2 U/ML (ref 0–3)
TTG IGA SER-ACNC: <2 U/ML (ref 0–3)
TTG IGG SER-ACNC: 2 U/ML (ref 0–5)

## 2024-10-07 DIAGNOSIS — K52.9 GASTROENTERITIS: ICD-10-CM

## 2024-10-07 RX ORDER — FAMOTIDINE 20 MG/1
TABLET, FILM COATED ORAL
Qty: 30 TABLET | Refills: 1 | Status: SHIPPED | OUTPATIENT
Start: 2024-10-07

## 2024-12-09 ENCOUNTER — OFFICE VISIT (OUTPATIENT)
Dept: ORTHOPEDIC SURGERY | Facility: CLINIC | Age: 19
End: 2024-12-09
Payer: COMMERCIAL

## 2024-12-09 VITALS — BODY MASS INDEX: 29.91 KG/M2 | HEIGHT: 65 IN | TEMPERATURE: 97.3 F | WEIGHT: 179.5 LBS

## 2024-12-09 DIAGNOSIS — R52 PAIN: ICD-10-CM

## 2024-12-09 DIAGNOSIS — M76.51 PATELLAR TENDINITIS OF RIGHT KNEE: Primary | ICD-10-CM

## 2024-12-09 DIAGNOSIS — G89.29 CHRONIC PATELLOFEMORAL PAIN OF RIGHT KNEE: ICD-10-CM

## 2024-12-09 DIAGNOSIS — M25.561 CHRONIC PATELLOFEMORAL PAIN OF RIGHT KNEE: ICD-10-CM

## 2024-12-09 PROCEDURE — 99213 OFFICE O/P EST LOW 20 MIN: CPT | Performed by: NURSE PRACTITIONER

## 2024-12-09 PROCEDURE — 73562 X-RAY EXAM OF KNEE 3: CPT | Performed by: NURSE PRACTITIONER
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
## 2024-12-09 NOTE — PATIENT INSTRUCTIONS
Voltaren gel- apply to the front of the knee 2-3 times per day.  Wear brace- when up during the day  Ice 2-3 times per day

## 2024-12-16 ENCOUNTER — OFFICE VISIT (OUTPATIENT)
Dept: OBSTETRICS AND GYNECOLOGY | Facility: CLINIC | Age: 19
End: 2024-12-16
Payer: COMMERCIAL

## 2024-12-16 VITALS
WEIGHT: 176 LBS | HEIGHT: 65 IN | SYSTOLIC BLOOD PRESSURE: 117 MMHG | BODY MASS INDEX: 29.32 KG/M2 | DIASTOLIC BLOOD PRESSURE: 87 MMHG

## 2024-12-16 DIAGNOSIS — R10.30 LOWER ABDOMINAL PAIN: ICD-10-CM

## 2024-12-16 DIAGNOSIS — Z30.09 ENCOUNTER FOR GENERAL COUNSELING AND ADVICE ON CONTRACEPTIVE MANAGEMENT: Primary | ICD-10-CM

## 2024-12-16 LAB
BILIRUB BLD-MCNC: NEGATIVE MG/DL
EXPIRATION DATE: ABNORMAL
GLUCOSE UR STRIP-MCNC: NEGATIVE MG/DL
KETONES UR QL: NEGATIVE
LEUKOCYTE EST, POC: ABNORMAL
Lab: ABNORMAL
NITRITE UR-MCNC: NEGATIVE MG/ML
PH UR: 5.5 [PH] (ref 5–8)
PROT UR STRIP-MCNC: ABNORMAL MG/DL
RBC # UR STRIP: NEGATIVE /UL
SP GR UR: 1.03 (ref 1–1.03)
UROBILINOGEN UR QL: ABNORMAL

## 2024-12-16 NOTE — PROGRESS NOTES
Matthias Weller is a 19 y.o. female.     Cc:  Birth control and lower abdominal pain    History of Present Illness - Patient is a 19 year old female who presents for discussion of contraception.  She is on Depo Provera and reports significant mood changes.  She would like to change to a different contraception.  She had an IUD previously but had it removed due to pain issues; removal of IUD did not improve pain, notably.  She is compliant with taking oral medications.  She also reports vague lower abdominal pain.  She denies STD exposure.    The following portions of the patient's history were reviewed and updated as appropriate: She  has a past medical history of Chlamydia (08/14/2023), Depression, Eczema, Gonorrhea, Herpes, and Syphilis.  She  reports that she has never smoked. She has never been exposed to tobacco smoke. She has never used smokeless tobacco. She reports that she does not currently use alcohol. She reports that she does not currently use drugs after having used the following drugs: Marijuana.  Current Outpatient Medications   Medication Sig Dispense Refill    acetaminophen (Tylenol) 325 MG tablet Every 4 (Four) Hours.      EPINEPHrine (EPIPEN) 0.3 MG/0.3ML solution auto-injector injection       famotidine (PEPCID) 20 MG tablet TAKE 1 TABLET BY MOUTH 2 TIMES A DAY AS NEEDED FOR HEARTBURN 30 tablet 1    omeprazole (priLOSEC) 20 MG capsule TAKE 2 CAPSULES BY MOUTH DAILY 60 capsule 2    ondansetron (ZOFRAN) 4 MG tablet Take 1 tablet by mouth Every 8 (Eight) Hours As Needed for Nausea or Vomiting. 21 tablet 0    sertraline (Zoloft) 50 MG tablet Take 1 tablet by mouth Daily. 90 tablet 3    ferrous sulfate 325 (65 FE) MG tablet Take 1 tablet by mouth Daily With Breakfast. (Patient not taking: Reported on 12/16/2024) 30 tablet 1    loperamide (IMODIUM) 2 MG capsule Take 1 capsule by mouth 4 (Four) Times a Day As Needed for Diarrhea. (Patient not taking: Reported on 12/16/2024) 21 capsule 0     norethindrone-ethinyl estradiol-ferrous fumarate (LOESTIN 24 FE) 1-20 MG-MCG(24) per tablet Take 1 tablet by mouth Daily. 28 tablet 12     No current facility-administered medications for this visit.     She is allergic to penicillins and amoxicillin..    Review of Systems   Genitourinary:  Negative for vaginal bleeding and vaginal discharge.       Objective   Physical Exam  Vitals reviewed. Exam conducted with a chaperone present.   Constitutional:       Appearance: Normal appearance.   Neurological:      Mental Status: She is alert.   Psychiatric:         Mood and Affect: Mood normal.         Behavior: Behavior normal.         Thought Content: Thought content normal.         Judgment: Judgment normal.         Assessment & Plan   Diagnoses and all orders for this visit:    1. Encounter for general counseling and advice on contraceptive management (Primary)  -     norethindrone-ethinyl estradiol-ferrous fumarate (LOESTIN 24 FE) 1-20 MG-MCG(24) per tablet; Take 1 tablet by mouth Daily.  Dispense: 28 tablet; Refill: 12  -     Discussed options including LARC (Implant and IUD) or trial of OCP.  Patient would like to try OCP.  She will do uCG on Sunday and start if negative.    2. Lower abdominal pain  -     Urine Culture - Urine, Urine, Clean Catch  -     Await culture.  If negative and pain still continues despite OCP, return for further testing/ultrasound.       Yoav Hernandez MD

## 2024-12-19 ENCOUNTER — TELEPHONE (OUTPATIENT)
Dept: OBSTETRICS AND GYNECOLOGY | Facility: CLINIC | Age: 19
End: 2024-12-19
Payer: COMMERCIAL

## 2024-12-19 LAB
BACTERIA UR CULT: NORMAL
BACTERIA UR CULT: NORMAL

## 2024-12-19 NOTE — TELEPHONE ENCOUNTER
This patient is a referral from Dr. Art.  This patient was contacted.  No answer.  A message was left to contact this clinic to discuss this referral.    Unable to reach pt, lvm

## 2024-12-23 ENCOUNTER — PREP FOR SURGERY (OUTPATIENT)
Dept: SURGERY | Facility: SURGERY CENTER | Age: 19
End: 2024-12-23
Payer: COMMERCIAL

## 2024-12-23 ENCOUNTER — OFFICE VISIT (OUTPATIENT)
Dept: GASTROENTEROLOGY | Facility: CLINIC | Age: 19
End: 2024-12-23
Payer: COMMERCIAL

## 2024-12-23 VITALS
WEIGHT: 176.8 LBS | DIASTOLIC BLOOD PRESSURE: 64 MMHG | HEIGHT: 65 IN | TEMPERATURE: 97.1 F | SYSTOLIC BLOOD PRESSURE: 100 MMHG | BODY MASS INDEX: 29.46 KG/M2

## 2024-12-23 DIAGNOSIS — R10.12 BILATERAL UPPER ABDOMINAL PAIN: Primary | ICD-10-CM

## 2024-12-23 DIAGNOSIS — R11.0 NAUSEA: ICD-10-CM

## 2024-12-23 DIAGNOSIS — R19.8 IRREGULAR BOWEL HABITS: ICD-10-CM

## 2024-12-23 DIAGNOSIS — R10.12 BILATERAL UPPER ABDOMINAL PAIN: ICD-10-CM

## 2024-12-23 DIAGNOSIS — R10.11 BILATERAL UPPER ABDOMINAL PAIN: Primary | ICD-10-CM

## 2024-12-23 DIAGNOSIS — R10.11 BILATERAL UPPER ABDOMINAL PAIN: ICD-10-CM

## 2024-12-23 DIAGNOSIS — R11.0 NAUSEA WITHOUT VOMITING: ICD-10-CM

## 2024-12-23 DIAGNOSIS — D50.9 IRON DEFICIENCY ANEMIA, UNSPECIFIED IRON DEFICIENCY ANEMIA TYPE: ICD-10-CM

## 2024-12-23 DIAGNOSIS — D50.9 IRON DEFICIENCY ANEMIA, UNSPECIFIED IRON DEFICIENCY ANEMIA TYPE: Primary | ICD-10-CM

## 2024-12-23 DIAGNOSIS — R10.30 LOWER ABDOMINAL PAIN: ICD-10-CM

## 2024-12-23 PROCEDURE — 99214 OFFICE O/P EST MOD 30 MIN: CPT | Performed by: NURSE PRACTITIONER

## 2024-12-23 RX ORDER — SODIUM CHLORIDE, SODIUM LACTATE, POTASSIUM CHLORIDE, CALCIUM CHLORIDE 600; 310; 30; 20 MG/100ML; MG/100ML; MG/100ML; MG/100ML
30 INJECTION, SOLUTION INTRAVENOUS ONCE
OUTPATIENT
Start: 2024-12-23

## 2024-12-23 RX ORDER — SODIUM CHLORIDE 0.9 % (FLUSH) 0.9 %
10 SYRINGE (ML) INJECTION AS NEEDED
OUTPATIENT
Start: 2024-12-23

## 2024-12-23 RX ORDER — SODIUM CHLORIDE 0.9 % (FLUSH) 0.9 %
3 SYRINGE (ML) INJECTION EVERY 12 HOURS SCHEDULED
OUTPATIENT
Start: 2024-12-23

## 2024-12-23 NOTE — PATIENT INSTRUCTIONS
I recommend that we proceed with colonoscopy for further evaluation, concerning for possible proctitis, colitis, ulceration. Other potential etiologies are not limited to ano-rectal masses, colo-rectal cancer, diverticulosis, inflammatory bowel disease, AVMs in the colon, polyps, or mass/malignancy.  We will also order for upper endoscopy to further evaluate your acid reflux, despite Omeprazole 40mg daily. Further recommendations to follow depending upon endoscopic findings.

## 2024-12-23 NOTE — PROGRESS NOTES
Chief Complaint  Abdominal Pain and Heartburn (Irregular bowel habits)    Subjective        Travon Weller is a 19 year-old female, presents here for follow-up with this NP from most recent visit on 9/24/2024 for upper abdominal pain, iron deficiency anemia, irregular bowel habits. Past medical history is significant for gastritis, childbirth related anemia, and chronic lumbar spine pain.  Office Visit with Sung Harrison APRN (09/24/2024)     History of Present Illness  The patient reports an exacerbation of her abdominal pain, describing it as cramp-like and localized to both the upper abdominal quadrants and slowly diffuse or spreads across the lower quadrants. The pain was particularly severe the previous night, causing significant distress. She continues to experience acid reflux symptoms, including heartburn and indigestion, which are exacerbated postprandially. The upper abdominal pain does not radiate to her back. She has been adhering to a diet devoid of greasy, spicy, sour, and acidic foods to the best of her ability. She also reports nausea but no episodes of vomiting. She does not require any medication refills at this time. She is currently on a regimen of omeprazole 40 mg daily.    She has been managing iron deficiency anemia with over-the-counter iron supplements since March 2024 but has recently exhausted her supply.    Her bowel movements have transitioned from loose to more formed stools. She reports no presence of blood in her stool but notes occasional blood upon wiping. She does not experience any straining during defecation. She has experienced a weight loss of 3 pounds since her last visit.    Supplemental Information  She has been experiencing back pain since the birth of her son on 03/20/2024, which she attributes to an epidural injection.    FAMILY HISTORY  She reports no family history of colon cancer.    MEDICATIONS  omeprazole, iron supplement    Results reviewed:  9/24/2024  B12  "430  Iron profile (iron low at 25, iron saturation 6)  TSH normal  Sed rate, CRP, celiac disease normal  Stool studies (occult blood test, ova and parasite, Giardia, fecal leukocytes, stool culture) -not completed    US Abdomen Complete (09/24/2024 11:51)   -Normal result.  No cholelithiasis or sludge, nondistended gallbladder.  Normal pancreas.  Liver measures 15.4 cm in length.      9/17/2024  H. pylori breath test negative    9/11/2024  Lipase, CBC, CMP normal    ER visit on 9/11/24 CT abdomen and pelvis -liver, spleen within normal limits.  Gallbladder is decompressed without biliary duct dilatation.  No bowel dilatation or evidence for bowel obstruction.  No appendicitis, ascites.  Urinary bladder wall thickening with low volume bladder, consider cystitis.    9/17/24 H. Pylori UBT negative, lipase, CMP, CBC normal values  9/17/24 abdominal ultrasound, pending (schedule to be done on 9/24/24)    Objective   Vital Signs:  /64   Temp 97.1 °F (36.2 °C)   Ht 165.1 cm (65\")   Wt 80.2 kg (176 lb 12.8 oz)   BMI 29.42 kg/m²   Estimated body mass index is 29.42 kg/m² as calculated from the following:    Height as of this encounter: 165.1 cm (65\").    Weight as of this encounter: 80.2 kg (176 lb 12.8 oz).  Facility age limit for growth %tomasa is 20 years.    Physical Exam  Vitals and nursing note reviewed.   Constitutional:       General: She is not in acute distress.     Appearance: Normal appearance. She is normal weight. She is not ill-appearing, toxic-appearing or diaphoretic.   Eyes:      General: No scleral icterus.     Conjunctiva/sclera: Conjunctivae normal.   Cardiovascular:      Rate and Rhythm: Normal rate and regular rhythm.      Pulses: Normal pulses.      Heart sounds: Normal heart sounds.   Pulmonary:      Effort: Pulmonary effort is normal. No respiratory distress.      Breath sounds: Normal breath sounds. No stridor.   Abdominal:      General: Abdomen is flat. Bowel sounds are normal. There is no " distension.      Palpations: Abdomen is soft. There is no mass.      Tenderness: There is no abdominal tenderness. There is no right CVA tenderness, left CVA tenderness, guarding or rebound.      Hernia: No hernia is present.   Skin:     Coloration: Skin is not jaundiced or pale.      Findings: No erythema or rash.   Neurological:      Mental Status: She is alert and oriented to person, place, and time. Mental status is at baseline.   Psychiatric:         Mood and Affect: Mood normal.             Assessment and Plan     Diagnoses and all orders for this visit:    1. Bilateral upper abdominal pain (Primary)    2. Irregular bowel habits    3. Iron deficiency anemia, unspecified iron deficiency anemia type    4. Nausea       Assessment & Plan  1. Upper abdominal pain, Persistent > lower abdominal pain  The patient reports worsening upper abdominal pain, which is worse after meals and sometimes radiates to her back. She is currently taking omeprazole 40 mg daily but continues to experience symptoms of acid reflux, including heartburn and indigestion.   She is advised to avoid greasy, spicy, sour, and acidic foods. Antiacid reflux precautions discussed.   An upper endoscopy will be performed to evaluate the cause of her symptoms.    2. Iron deficiency anemia.  The patient has been on iron supplements since March and reports running out of her prescription. She is advised to continue taking iron supplements with orange juice on an empty stomach for better absorption.    3. Constipation, for now  4. Irregular bowel regimens  The patient reports a change from loose stools to constipation. She is advised to take MiraLAX once or twice daily as needed to counteract constipation that may occur with iron supplementation  The patient reports occasional blood when wiping after bowel movements. A lower endoscopy will be performed to investigate the cause of the bleeding.    Patient Instructions   I recommend that we proceed with  colonoscopy for further evaluation, concerning for possible proctitis, colitis, ulceration. Other potential etiologies are not limited to ano-rectal masses, colo-rectal cancer, diverticulosis, inflammatory bowel disease, AVMs in the colon, polyps, or mass/malignancy.  We will also order for upper endoscopy to further evaluate your acid reflux, despite Omeprazole 40mg daily. Further recommendations to follow depending upon endoscopic findings.       Return for REturn after endoscopic procedures.    I have reviewed patient's current medications list, relevant clinical information necessary for today's encounter. I discussed the clinical impression and treatment plan with the patient, who verbalized understanding and in agreement.  All questions were answered and support was provided.     Patient or patient representative verbalized consent for the use of Ambient Listening during the visit with  NIALL Lewis for chart documentation. 12/23/2024  08:48 EST

## 2024-12-30 ENCOUNTER — TREATMENT (OUTPATIENT)
Dept: PHYSICAL THERAPY | Facility: CLINIC | Age: 19
End: 2024-12-30
Payer: COMMERCIAL

## 2024-12-30 DIAGNOSIS — M25.561 CHRONIC PATELLOFEMORAL PAIN OF RIGHT KNEE: Primary | ICD-10-CM

## 2024-12-30 DIAGNOSIS — R29.898 DECREASED STRENGTH OF LOWER EXTREMITY: ICD-10-CM

## 2024-12-30 DIAGNOSIS — G89.29 CHRONIC PATELLOFEMORAL PAIN OF RIGHT KNEE: Primary | ICD-10-CM

## 2024-12-30 DIAGNOSIS — M76.51 PATELLAR TENDINITIS OF RIGHT KNEE: ICD-10-CM

## 2024-12-30 PROCEDURE — 97530 THERAPEUTIC ACTIVITIES: CPT

## 2024-12-30 PROCEDURE — 97161 PT EVAL LOW COMPLEX 20 MIN: CPT

## 2024-12-30 PROCEDURE — 97110 THERAPEUTIC EXERCISES: CPT
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
## 2025-01-13 ENCOUNTER — TELEPHONE (OUTPATIENT)
Dept: GASTROENTEROLOGY | Facility: CLINIC | Age: 20
End: 2025-01-13
Payer: COMMERCIAL

## 2025-01-13 NOTE — TELEPHONE ENCOUNTER
"  Caller: Travon Weller \"Sammie\"    Relationship: Self    Best call back number: [phone]    What is the best time to reach you: ANYTIME     Who are you requesting to speak with (clinical staff, provider,  specific staff member): CLINICAL STAFF         What was the call regarding: PT IS CALLING TO REPORT REALLY BAD STOMACH CRAMPING. PLEASE CALL AND ADVISE         "

## 2025-01-14 DIAGNOSIS — R10.12 BILATERAL UPPER ABDOMINAL PAIN: Primary | ICD-10-CM

## 2025-01-14 DIAGNOSIS — R10.11 BILATERAL UPPER ABDOMINAL PAIN: Primary | ICD-10-CM

## 2025-01-14 NOTE — TELEPHONE ENCOUNTER
Please ask patient to proceed with HIDA scan as ordered. She should receive a call from the scheduling department about this test to further evaluate GB ejection fraction and I will further advise.   Follow up with me after endoscopic workups.   Continue with scheduled Miralax twice daily until a good bowel control, normal soften consistency, then take it daily unless she develops any loose stool, then stop.  Thanks.

## 2025-01-29 VITALS — WEIGHT: 175 LBS | BODY MASS INDEX: 29.12 KG/M2

## 2025-01-30 ENCOUNTER — HOSPITAL ENCOUNTER (OUTPATIENT)
Dept: NUCLEAR MEDICINE | Facility: HOSPITAL | Age: 20
Discharge: HOME | End: 2025-01-30
Payer: COMMERCIAL

## 2025-01-30 PROCEDURE — A9537 TC99M MEBROFENIN: HCPCS | Performed by: NURSE PRACTITIONER

## 2025-01-30 PROCEDURE — 78227 HEPATOBIL SYST IMAGE W/DRUG: CPT

## 2025-01-30 PROCEDURE — 25010000002 SINCALIDE PER 5 MCG: Performed by: NURSE PRACTITIONER

## 2025-01-30 PROCEDURE — 34310000005 TECHNETIUM TC 99M MEBROFENIN KIT: Performed by: NURSE PRACTITIONER

## 2025-01-30 RX ORDER — KIT FOR THE PREPARATION OF TECHNETIUM TC 99M MEBROFENIN 45 MG/10ML
1 INJECTION, POWDER, LYOPHILIZED, FOR SOLUTION INTRAVENOUS
Status: COMPLETED | OUTPATIENT
Start: 2025-01-30 | End: 2025-01-30

## 2025-01-30 RX ADMIN — MEBROFENIN 1 DOSE: 45 INJECTION, POWDER, LYOPHILIZED, FOR SOLUTION INTRAVENOUS at 07:20

## 2025-01-30 RX ADMIN — SINCALIDE 1.6 MCG: 5 INJECTION, POWDER, LYOPHILIZED, FOR SOLUTION INTRAVENOUS at 08:23

## 2025-02-03 ENCOUNTER — ANESTHESIA (OUTPATIENT)
Dept: SURGERY | Facility: SURGERY CENTER | Age: 20
End: 2025-02-03
Payer: COMMERCIAL

## 2025-02-03 ENCOUNTER — TELEPHONE (OUTPATIENT)
Dept: GASTROENTEROLOGY | Facility: CLINIC | Age: 20
End: 2025-02-03

## 2025-02-03 ENCOUNTER — ANESTHESIA EVENT (OUTPATIENT)
Dept: SURGERY | Facility: SURGERY CENTER | Age: 20
End: 2025-02-03
Payer: COMMERCIAL

## 2025-02-03 ENCOUNTER — HOSPITAL ENCOUNTER (OUTPATIENT)
Facility: SURGERY CENTER | Age: 20
Setting detail: HOSPITAL OUTPATIENT SURGERY
Discharge: HOME | End: 2025-02-03
Attending: INTERNAL MEDICINE | Admitting: INTERNAL MEDICINE
Payer: COMMERCIAL

## 2025-02-03 ENCOUNTER — DOCUMENTATION (OUTPATIENT)
Dept: PHYSICAL THERAPY | Facility: CLINIC | Age: 20
End: 2025-02-03
Payer: COMMERCIAL

## 2025-02-03 VITALS
HEART RATE: 72 BPM | SYSTOLIC BLOOD PRESSURE: 109 MMHG | TEMPERATURE: 98.2 F | RESPIRATION RATE: 16 BRPM | OXYGEN SATURATION: 98 % | BODY MASS INDEX: 28.66 KG/M2 | HEIGHT: 65 IN | WEIGHT: 172 LBS | DIASTOLIC BLOOD PRESSURE: 74 MMHG

## 2025-02-03 DIAGNOSIS — R10.30 LOWER ABDOMINAL PAIN: ICD-10-CM

## 2025-02-03 DIAGNOSIS — D50.9 IRON DEFICIENCY ANEMIA, UNSPECIFIED IRON DEFICIENCY ANEMIA TYPE: ICD-10-CM

## 2025-02-03 DIAGNOSIS — R11.0 NAUSEA WITHOUT VOMITING: ICD-10-CM

## 2025-02-03 DIAGNOSIS — R10.12 BILATERAL UPPER ABDOMINAL PAIN: ICD-10-CM

## 2025-02-03 DIAGNOSIS — R10.11 BILATERAL UPPER ABDOMINAL PAIN: ICD-10-CM

## 2025-02-03 DIAGNOSIS — R19.8 IRREGULAR BOWEL HABITS: ICD-10-CM

## 2025-02-03 PROCEDURE — 43239 EGD BIOPSY SINGLE/MULTIPLE: CPT | Performed by: INTERNAL MEDICINE

## 2025-02-03 PROCEDURE — 45380 COLONOSCOPY AND BIOPSY: CPT | Performed by: INTERNAL MEDICINE

## 2025-02-03 PROCEDURE — 25010000002 PROPOFOL 10 MG/ML EMULSION: Performed by: NURSE ANESTHETIST, CERTIFIED REGISTERED

## 2025-02-03 PROCEDURE — 88305 TISSUE EXAM BY PATHOLOGIST: CPT | Performed by: INTERNAL MEDICINE

## 2025-02-03 PROCEDURE — 81025 URINE PREGNANCY TEST: CPT | Performed by: INTERNAL MEDICINE

## 2025-02-03 PROCEDURE — 25010000002 PROPOFOL 200 MG/20ML EMULSION: Performed by: NURSE ANESTHETIST, CERTIFIED REGISTERED

## 2025-02-03 PROCEDURE — 25810000003 LACTATED RINGERS PER 1000 ML: Performed by: NURSE ANESTHETIST, CERTIFIED REGISTERED

## 2025-02-03 PROCEDURE — 25010000002 LIDOCAINE 1 % SOLUTION: Performed by: NURSE ANESTHETIST, CERTIFIED REGISTERED

## 2025-02-03 PROCEDURE — 25810000003 LACTATED RINGERS PER 1000 ML: Performed by: NURSE PRACTITIONER

## 2025-02-03 RX ORDER — LIDOCAINE HYDROCHLORIDE 10 MG/ML
INJECTION, SOLUTION INFILTRATION; PERINEURAL AS NEEDED
Status: DISCONTINUED | OUTPATIENT
Start: 2025-02-03 | End: 2025-02-03 | Stop reason: SURG

## 2025-02-03 RX ORDER — SODIUM CHLORIDE, SODIUM LACTATE, POTASSIUM CHLORIDE, CALCIUM CHLORIDE 600; 310; 30; 20 MG/100ML; MG/100ML; MG/100ML; MG/100ML
INJECTION, SOLUTION INTRAVENOUS CONTINUOUS PRN
Status: DISCONTINUED | OUTPATIENT
Start: 2025-02-03 | End: 2025-02-03 | Stop reason: SURG

## 2025-02-03 RX ORDER — SODIUM CHLORIDE 0.9 % (FLUSH) 0.9 %
3 SYRINGE (ML) INJECTION EVERY 12 HOURS SCHEDULED
Status: DISCONTINUED | OUTPATIENT
Start: 2025-02-03 | End: 2025-02-03 | Stop reason: HOSPADM

## 2025-02-03 RX ORDER — SODIUM CHLORIDE 0.9 % (FLUSH) 0.9 %
10 SYRINGE (ML) INJECTION AS NEEDED
Status: DISCONTINUED | OUTPATIENT
Start: 2025-02-03 | End: 2025-02-03 | Stop reason: HOSPADM

## 2025-02-03 RX ORDER — SODIUM CHLORIDE, SODIUM LACTATE, POTASSIUM CHLORIDE, CALCIUM CHLORIDE 600; 310; 30; 20 MG/100ML; MG/100ML; MG/100ML; MG/100ML
30 INJECTION, SOLUTION INTRAVENOUS ONCE
Status: COMPLETED | OUTPATIENT
Start: 2025-02-03 | End: 2025-02-03

## 2025-02-03 RX ORDER — PROPOFOL 10 MG/ML
INJECTION, EMULSION INTRAVENOUS AS NEEDED
Status: DISCONTINUED | OUTPATIENT
Start: 2025-02-03 | End: 2025-02-03 | Stop reason: SURG

## 2025-02-03 RX ADMIN — PROPOFOL 100 MG: 10 INJECTION, EMULSION INTRAVENOUS at 12:44

## 2025-02-03 RX ADMIN — SODIUM CHLORIDE, POTASSIUM CHLORIDE, SODIUM LACTATE AND CALCIUM CHLORIDE 30 ML/HR: 600; 310; 30; 20 INJECTION, SOLUTION INTRAVENOUS at 12:40

## 2025-02-03 RX ADMIN — SODIUM CHLORIDE, POTASSIUM CHLORIDE, SODIUM LACTATE AND CALCIUM CHLORIDE: 600; 310; 30; 20 INJECTION, SOLUTION INTRAVENOUS at 12:42

## 2025-02-03 RX ADMIN — PROPOFOL 160 MCG/KG/MIN: 10 INJECTION, EMULSION INTRAVENOUS at 12:44

## 2025-02-03 RX ADMIN — LIDOCAINE HYDROCHLORIDE 100 MG: 10 INJECTION, SOLUTION INFILTRATION; PERINEURAL at 12:44

## 2025-02-03 NOTE — ANESTHESIA PREPROCEDURE EVALUATION
Anesthesia Evaluation     Patient summary reviewed   no history of anesthetic complications:   NPO Solid Status: > 8 hours  NPO Liquid Status: > 2 hours           Airway   Mallampati: I  TM distance: >3 FB  Neck ROM: full  No difficulty expected  Dental      Pulmonary     breath sounds clear to auscultation  (-) shortness of breath, recent URI, not a smoker  Cardiovascular   Exercise tolerance: good (4-7 METS)    Rhythm: regular  Rate: normal    (-) past MI, dysrhythmias, angina      Neuro/Psych  (+) psychiatric history Depression  (-) seizures, CVA  GI/Hepatic/Renal/Endo    (-)  obesity    ROS Comment: GI complaints of abd pain, bloating, bowel changes    Musculoskeletal     (-) neck stiffness  Abdominal    Substance History      OB/GYN          Other                        Anesthesia Plan    ASA 2     MAC     (MAC anesthesia discussed with patient and/or patient representative. Risks (including but not limited to intra-op awareness), benefits, and alternatives were discussed. Understanding was voiced with an agreement to proceed with a MAC technique and General as a backup option. )    Anesthetic plan, risks, benefits, and alternatives have been provided, discussed and informed consent has been obtained with: patient.      CODE STATUS:
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
## 2025-02-03 NOTE — TELEPHONE ENCOUNTER
"    Caller: Travon Weller \"Sammie\"    Relationship to patient: Self    Best call back number: [phone]     Patient is needing: PATIENT HAS AN ARRIVAL TIME OF 12:00 PM FOR HER PROCEDURE TODAY BUT STATES SHE WILL BE APPROXIMATELY 5 MIN LATE.      "

## 2025-02-03 NOTE — ANESTHESIA POSTPROCEDURE EVALUATION
"Patient: Travon Weller    Procedure Summary       Date: 02/03/25 Room / Location: [facility]    Anesthesia Start: 1241 Anesthesia Stop: 1307    Procedures:       ESOPHAGOGASTRODUODENOSCOPY      COLONOSCOPY FOR SCREENING Diagnosis:       Iron deficiency anemia, unspecified iron deficiency anemia type      Bilateral upper abdominal pain      Nausea without vomiting      Irregular bowel habits      Lower abdominal pain      (Iron deficiency anemia, unspecified iron deficiency anemia type [D50.9])      (Bilateral upper abdominal pain [R10.11, R10.12])      (Nausea without vomiting [R11.0])      (Irregular bowel habits [R19.8])      (Lower abdominal pain [R10.30])    Surgeons: Aaron Betancur MD Provider: Luis Miguel Carballo DO    Anesthesia Type: MAC ASA Status: 2            Anesthesia Type: MAC    Vitals  Vitals Value Taken Time   /67 02/03/25 1328   Temp 36.8 °C (98.2 °F) 02/03/25 1308   Pulse 74 02/03/25 1328   Resp 16 02/03/25 1328   SpO2 98 % 02/03/25 1328           Post Anesthesia Care and Evaluation    Patient location during evaluation: bedside  Patient participation: complete - patient participated  Level of consciousness: awake and alert  Pain management: adequate    Airway patency: patent  Anesthetic complications: No anesthetic complications  PONV Status: controlled  Cardiovascular status: acceptable and hemodynamically stable  Respiratory status: acceptable, spontaneous ventilation and nonlabored ventilation  Hydration status: acceptable    Comments: /67   Pulse 74   Temp 36.8 °C (98.2 °F) (Temporal)   Resp 16   Ht 165.1 cm (65\")   Wt 78 kg (172 lb)   SpO2 98%   BMI 28.62 kg/m²       "

## 2025-02-04 LAB
CYTO UR: NORMAL
LAB AP CASE REPORT: NORMAL
PATH REPORT.FINAL DX SPEC: NORMAL
PATH REPORT.GROSS SPEC: NORMAL

## 2025-02-17 ENCOUNTER — TELEPHONE (OUTPATIENT)
Dept: OBSTETRICS AND GYNECOLOGY | Facility: CLINIC | Age: 20
End: 2025-02-17
Payer: COMMERCIAL

## 2025-02-17 DIAGNOSIS — Z32.01 POSITIVE PREGNANCY TEST: Primary | ICD-10-CM

## 2025-02-17 NOTE — TELEPHONE ENCOUNTER
Patient showed up to the office today for a blood draw with no ordered pending. After further investigating, she states she called the office on Friday and spoke to someone she the patient stated she had a faint positive pregnancy ucg and then a negative ucg, she was told she could come in for a QHCG. This was ordered. 2-17-25/lw

## 2025-02-18 ENCOUNTER — TELEPHONE (OUTPATIENT)
Dept: OBSTETRICS AND GYNECOLOGY | Facility: CLINIC | Age: 20
End: 2025-02-18
Payer: COMMERCIAL

## 2025-02-18 LAB — HCG INTACT+B SERPL-ACNC: <1 MIU/ML

## (undated) DEVICE — SINGLE-USE BIOPSY FORCEPS: Brand: RADIAL JAW 4

## (undated) DEVICE — ADAPT CLN SCPE ENDO PORPOISE BX/50 DISP

## (undated) DEVICE — KT ORCA ORCAPOD DISP STRL

## (undated) DEVICE — GOWN ISOL W/THUMB UNIV BLU BX/15

## (undated) DEVICE — VIAL FORMLN CAP 10PCT 20ML

## (undated) DEVICE — MSK ENDO PORT O2 POM ELITE CURAPLEX A/

## (undated) DEVICE — Device

## (undated) DEVICE — FLEX ADVANTAGE 1500CC: Brand: FLEX ADVANTAGE

## (undated) DEVICE — BLCK/BITE BLOX W/DENTL/RIM W/STRAP 54F

## (undated) DEVICE — GOWN ,SIRUS,NONREINFORCED 3XL: Brand: MEDLINE